# Patient Record
Sex: MALE | Race: BLACK OR AFRICAN AMERICAN | NOT HISPANIC OR LATINO | URBAN - METROPOLITAN AREA
[De-identification: names, ages, dates, MRNs, and addresses within clinical notes are randomized per-mention and may not be internally consistent; named-entity substitution may affect disease eponyms.]

---

## 2020-06-22 ENCOUNTER — INPATIENT (INPATIENT)
Facility: HOSPITAL | Age: 55
LOS: 1 days | Discharge: PSYCHIATRIC OR SUBSTANCE USE FACILITY | End: 2020-06-24
Attending: INTERNAL MEDICINE | Admitting: INTERNAL MEDICINE
Payer: MEDICAID

## 2020-06-22 VITALS
HEART RATE: 95 BPM | OXYGEN SATURATION: 95 % | RESPIRATION RATE: 18 BRPM | TEMPERATURE: 99 F | DIASTOLIC BLOOD PRESSURE: 89 MMHG | SYSTOLIC BLOOD PRESSURE: 134 MMHG

## 2020-06-22 DIAGNOSIS — F25.9 SCHIZOAFFECTIVE DISORDER, UNSPECIFIED: ICD-10-CM

## 2020-06-22 DIAGNOSIS — E11.9 TYPE 2 DIABETES MELLITUS WITHOUT COMPLICATIONS: ICD-10-CM

## 2020-06-22 DIAGNOSIS — R07.9 CHEST PAIN, UNSPECIFIED: ICD-10-CM

## 2020-06-22 DIAGNOSIS — E78.5 HYPERLIPIDEMIA, UNSPECIFIED: ICD-10-CM

## 2020-06-22 DIAGNOSIS — Z29.9 ENCOUNTER FOR PROPHYLACTIC MEASURES, UNSPECIFIED: ICD-10-CM

## 2020-06-22 LAB
ALBUMIN SERPL ELPH-MCNC: 4.3 G/DL — SIGNIFICANT CHANGE UP (ref 3.3–5)
ALP SERPL-CCNC: 56 U/L — SIGNIFICANT CHANGE UP (ref 40–120)
ALT FLD-CCNC: 20 U/L — SIGNIFICANT CHANGE UP (ref 4–41)
ANION GAP SERPL CALC-SCNC: 12 MMO/L — SIGNIFICANT CHANGE UP (ref 7–14)
AST SERPL-CCNC: 15 U/L — SIGNIFICANT CHANGE UP (ref 4–40)
BASOPHILS # BLD AUTO: 0.04 K/UL — SIGNIFICANT CHANGE UP (ref 0–0.2)
BASOPHILS NFR BLD AUTO: 0.9 % — SIGNIFICANT CHANGE UP (ref 0–2)
BILIRUB SERPL-MCNC: 0.5 MG/DL — SIGNIFICANT CHANGE UP (ref 0.2–1.2)
BUN SERPL-MCNC: 12 MG/DL — SIGNIFICANT CHANGE UP (ref 7–23)
CALCIUM SERPL-MCNC: 9.7 MG/DL — SIGNIFICANT CHANGE UP (ref 8.4–10.5)
CHLORIDE SERPL-SCNC: 98 MMOL/L — SIGNIFICANT CHANGE UP (ref 98–107)
CO2 SERPL-SCNC: 28 MMOL/L — SIGNIFICANT CHANGE UP (ref 22–31)
CREAT SERPL-MCNC: 0.81 MG/DL — SIGNIFICANT CHANGE UP (ref 0.5–1.3)
D DIMER BLD IA.RAPID-MCNC: < 150 NG/ML — SIGNIFICANT CHANGE UP
EOSINOPHIL # BLD AUTO: 0.09 K/UL — SIGNIFICANT CHANGE UP (ref 0–0.5)
EOSINOPHIL NFR BLD AUTO: 2 % — SIGNIFICANT CHANGE UP (ref 0–6)
GLUCOSE BLDC GLUCOMTR-MCNC: 205 MG/DL — HIGH (ref 70–99)
GLUCOSE SERPL-MCNC: 151 MG/DL — HIGH (ref 70–99)
HCT VFR BLD CALC: 39 % — SIGNIFICANT CHANGE UP (ref 39–50)
HGB BLD-MCNC: 12.4 G/DL — LOW (ref 13–17)
IMM GRANULOCYTES NFR BLD AUTO: 0.4 % — SIGNIFICANT CHANGE UP (ref 0–1.5)
INR BLD: 0.97 — SIGNIFICANT CHANGE UP (ref 0.88–1.17)
LYMPHOCYTES # BLD AUTO: 1.57 K/UL — SIGNIFICANT CHANGE UP (ref 1–3.3)
LYMPHOCYTES # BLD AUTO: 34.7 % — SIGNIFICANT CHANGE UP (ref 13–44)
MCHC RBC-ENTMCNC: 30.8 PG — SIGNIFICANT CHANGE UP (ref 27–34)
MCHC RBC-ENTMCNC: 31.8 % — LOW (ref 32–36)
MCV RBC AUTO: 97 FL — SIGNIFICANT CHANGE UP (ref 80–100)
MONOCYTES # BLD AUTO: 0.46 K/UL — SIGNIFICANT CHANGE UP (ref 0–0.9)
MONOCYTES NFR BLD AUTO: 10.2 % — SIGNIFICANT CHANGE UP (ref 2–14)
NEUTROPHILS # BLD AUTO: 2.34 K/UL — SIGNIFICANT CHANGE UP (ref 1.8–7.4)
NEUTROPHILS NFR BLD AUTO: 51.8 % — SIGNIFICANT CHANGE UP (ref 43–77)
NRBC # FLD: 0 K/UL — SIGNIFICANT CHANGE UP (ref 0–0)
PLATELET # BLD AUTO: 202 K/UL — SIGNIFICANT CHANGE UP (ref 150–400)
PMV BLD: 10.9 FL — SIGNIFICANT CHANGE UP (ref 7–13)
POTASSIUM SERPL-MCNC: 4.3 MMOL/L — SIGNIFICANT CHANGE UP (ref 3.5–5.3)
POTASSIUM SERPL-SCNC: 4.3 MMOL/L — SIGNIFICANT CHANGE UP (ref 3.5–5.3)
PROT SERPL-MCNC: 6.6 G/DL — SIGNIFICANT CHANGE UP (ref 6–8.3)
PROTHROM AB SERPL-ACNC: 11.1 SEC — SIGNIFICANT CHANGE UP (ref 9.8–13.1)
RBC # BLD: 4.02 M/UL — LOW (ref 4.2–5.8)
RBC # FLD: 13.3 % — SIGNIFICANT CHANGE UP (ref 10.3–14.5)
SODIUM SERPL-SCNC: 138 MMOL/L — SIGNIFICANT CHANGE UP (ref 135–145)
TROPONIN T, HIGH SENSITIVITY: 6 NG/L — SIGNIFICANT CHANGE UP (ref ?–14)
TROPONIN T, HIGH SENSITIVITY: 7 NG/L — SIGNIFICANT CHANGE UP (ref ?–14)
WBC # BLD: 4.52 K/UL — SIGNIFICANT CHANGE UP (ref 3.8–10.5)
WBC # FLD AUTO: 4.52 K/UL — SIGNIFICANT CHANGE UP (ref 3.8–10.5)

## 2020-06-22 RX ORDER — HALOPERIDOL DECANOATE 100 MG/ML
5 INJECTION INTRAMUSCULAR
Refills: 0 | Status: DISCONTINUED | OUTPATIENT
Start: 2020-06-22 | End: 2020-06-24

## 2020-06-22 RX ORDER — SODIUM CHLORIDE 9 MG/ML
1000 INJECTION, SOLUTION INTRAVENOUS
Refills: 0 | Status: DISCONTINUED | OUTPATIENT
Start: 2020-06-22 | End: 2020-06-24

## 2020-06-22 RX ORDER — DEXTROSE 50 % IN WATER 50 %
25 SYRINGE (ML) INTRAVENOUS ONCE
Refills: 0 | Status: DISCONTINUED | OUTPATIENT
Start: 2020-06-22 | End: 2020-06-24

## 2020-06-22 RX ORDER — INSULIN LISPRO 100/ML
VIAL (ML) SUBCUTANEOUS AT BEDTIME
Refills: 0 | Status: DISCONTINUED | OUTPATIENT
Start: 2020-06-22 | End: 2020-06-24

## 2020-06-22 RX ORDER — ENOXAPARIN SODIUM 100 MG/ML
40 INJECTION SUBCUTANEOUS DAILY
Refills: 0 | Status: DISCONTINUED | OUTPATIENT
Start: 2020-06-22 | End: 2020-06-24

## 2020-06-22 RX ORDER — BENZTROPINE MESYLATE 1 MG
1 TABLET ORAL DAILY
Refills: 0 | Status: DISCONTINUED | OUTPATIENT
Start: 2020-06-22 | End: 2020-06-24

## 2020-06-22 RX ORDER — CHOLECALCIFEROL (VITAMIN D3) 125 MCG
2000 CAPSULE ORAL DAILY
Refills: 0 | Status: DISCONTINUED | OUTPATIENT
Start: 2020-06-22 | End: 2020-06-24

## 2020-06-22 RX ORDER — DEXTROSE 50 % IN WATER 50 %
15 SYRINGE (ML) INTRAVENOUS ONCE
Refills: 0 | Status: DISCONTINUED | OUTPATIENT
Start: 2020-06-22 | End: 2020-06-24

## 2020-06-22 RX ORDER — DEXTROSE 50 % IN WATER 50 %
12.5 SYRINGE (ML) INTRAVENOUS ONCE
Refills: 0 | Status: DISCONTINUED | OUTPATIENT
Start: 2020-06-22 | End: 2020-06-24

## 2020-06-22 RX ORDER — INSULIN LISPRO 100/ML
VIAL (ML) SUBCUTANEOUS
Refills: 0 | Status: DISCONTINUED | OUTPATIENT
Start: 2020-06-22 | End: 2020-06-24

## 2020-06-22 RX ORDER — ACETAMINOPHEN 500 MG
650 TABLET ORAL EVERY 6 HOURS
Refills: 0 | Status: DISCONTINUED | OUTPATIENT
Start: 2020-06-22 | End: 2020-06-24

## 2020-06-22 RX ORDER — DIPHENHYDRAMINE HCL 50 MG
50 CAPSULE ORAL AT BEDTIME
Refills: 0 | Status: DISCONTINUED | OUTPATIENT
Start: 2020-06-22 | End: 2020-06-24

## 2020-06-22 RX ORDER — ATORVASTATIN CALCIUM 80 MG/1
10 TABLET, FILM COATED ORAL AT BEDTIME
Refills: 0 | Status: DISCONTINUED | OUTPATIENT
Start: 2020-06-22 | End: 2020-06-24

## 2020-06-22 RX ORDER — GLUCAGON INJECTION, SOLUTION 0.5 MG/.1ML
1 INJECTION, SOLUTION SUBCUTANEOUS ONCE
Refills: 0 | Status: DISCONTINUED | OUTPATIENT
Start: 2020-06-22 | End: 2020-06-24

## 2020-06-22 NOTE — ED ADULT NURSE NOTE - OBJECTIVE STATEMENT
Pt A/Ox4 states he noted intermittent CP that started yesterday when he was sitting in his bed, denies SOB, denies n/v denies diaphoresis, cough f/c. Pt denies radiation of pain. Pt appears well, breathing even and unlabored, skin warm and dry. Noted 2 staff members at bedside with pt. Pt calm at this time. PIV inserted with aseptic technique, blood drawn, labeled at bedside with 2 pt identifiers and sent to lab. Pt aware of POC, NAD. Will continue to monitor. Pt placed on cardiac monitor.

## 2020-06-22 NOTE — H&P ADULT - PROBLEM SELECTOR PLAN 3
-unclear if on insulin, will need to clarify in AM  -for now, continue with low ISS  -CC diet  -f/u hgba1c -unclear if on insulin, will need to clarify in AM. Med rec pharm emailed  -for now, continue with low ISS  -CC diet  -f/u hgba1c

## 2020-06-22 NOTE — ED PROVIDER NOTE - ATTENDING CONTRIBUTION TO CARE
I performed a history and physical exam of the patient and discussed their management with the PA. I reviewed the PA's note and agree with the documented findings and plan of care. I have edited as appropriate. My medical decision making and observations are found above.  L sided chest pain, normal CV exam, multiple CV risk factors, admit to medicine for ACS w/u

## 2020-06-22 NOTE — H&P ADULT - NSHPSOCIALHISTORY_GEN_ALL_CORE
Quit smoking 1 year ago. Previously would smoke 1ppd. Also last used cocaine 1 year ago. Social alcohol use  Resident of [facility]

## 2020-06-22 NOTE — H&P ADULT - ASSESSMENT
56 y/o male with a hx of Szhizoaffective d/o (bipolar type) ([facility] resident), DM, HTN, HLD, tremors 2/2 to psychotropic meds p/w chest pain admitted for r/o ACS

## 2020-06-22 NOTE — H&P ADULT - NSHPLABSRESULTS_GEN_ALL_CORE
(06-22 @ 16:10)                      12.4  4.52 )-----------( 202                 39.0    Neutrophils = 2.34 (51.8%)  Lymphocytes = 1.57 (34.7%)  Eosinophils = 0.09 (2.0%)  Basophils = 0.04 (0.9%)  Monocytes = 0.46 (10.2%)  Bands = --%    06-22    138  |  98  |  12  ----------------------------<  151<H>  4.3   |  28  |  0.81    Ca    9.7      22 Jun 2020 16:10    TPro  6.6  /  Alb  4.3  /  TBili  0.5  /  DBili  x   /  AST  15  /  ALT  20  /  AlkPhos  56  06-22    ( 22 Jun 2020 16:10 )   PT: 11.1 SEC;   INR: 0.97 ;    < from: Xray Chest 2 Views PA/Lat (06.22.20 @ 18:57) >    IMPRESSION:     Clear lungs.        < end of copied text >        Labs reviewed  Imaging reviewed  EKG: NSR, No ST segment elevations or depression. T wave inversions v3-v4, biphasic waves in v5 v6

## 2020-06-22 NOTE — ED PROVIDER NOTE - CLINICAL SUMMARY MEDICAL DECISION MAKING FREE TEXT BOX
56 y/o male with a hx of DM, HTN, HLD, Tremors, Former smoker, hx of cocaine use(8 months ago) presents to the ER c/o 2 days of left sided chest pain. Pt is well appearing, NAD, will check labs, CXR, admit.

## 2020-06-22 NOTE — ED ADULT TRIAGE NOTE - PRO INTERPRETER NEED 2
DATE OF SERVICE:  03/27/20

CLINICAL DATA:  chest and back pain



ENHANCED CHEST CT:  



Multislice acquisition through the chest with IV contrast was performed. 



No priors.  



No evidence of PE.  No pneumothorax.  No pleural effusion.  No aortic aneurysm 
or dissection.  



There are mild emphysematous changes throughout both lungs.  There are linear 
densities in both lung bases consistent with linear atelectasis or fibrosis.  
There is a 4 mm calcified nodule in the right lung base consistent with a 
calcified granuloma from prior granulomatous disease.  



The lungs are otherwise clear. 



The heart size is normal.  No significant pericardial effusion.  



No hilar or mediastinal adenopathy. 



There is a 1.9 cm sharply transcribed fluid density lesion in the left lobe of 
the liver consistent with a benign hepatic cyst.  There is another sharply 
transcribed fluid density lesion in the right lobe of the liver adjacent to the 
gallbladder fossa consistent with benign cyst.  



There is some reflux of the contrast into the right hepatic vein suggesting the 
possibility of right heart failure.  



There is gas within the cervical esophagus.  This is most likely related to GE 
reflux.  



There is a small sclerotic focus within the left 10th rib laterally.  This is 
probably a bone island.  



No other significant findings. 



[id number]
[facility]

## 2020-06-22 NOTE — H&P ADULT - NSHPPHYSICALEXAM_GEN_ALL_CORE
GENERAL APPEARANCE: Well developed, well nourished, alert and cooperative. NAD.   HEENT:  PERRL, EOMI. External auditory canals normal, hearing grossly intact.  NECK: Neck supple, non-tender without lymphadenopathy, masses or thyromegaly.  CARDIAC: Normal S1 and S2. No S3, S4 or murmurs. Rhythm is regular.  LUNGS: Clear to auscultation and percussion without rales, rhonchi, wheezing or diminished breath sounds.  ABDOMEN: Positive bowel sounds. Soft, nondistended, nontender. No guarding or rebound.   MUSCULOSKELETAL: ROM intact spine and extremities. No joint erythema or tenderness.   BACK: normal gait and posture, no spinal deformity, symmetry of spinal muscles, without tenderness, decreased range of motion.  EXTREMITIES: No significant deformity or joint abnormality. No edema. Peripheral pulses intact. No varicosities.  NEUROLOGICAL: CN II-XII intact. Strength and sensation symmetric and intact throughout.   SKIN: Skin normal color, texture and turgor with no lesions or eruptions.  PSYCHIATRIC: AOx3.Normal affect and behavior. GENERAL APPEARANCE: Well developed, well nourished, disheveled, alert and cooperative. NAD.   HEENT:  PERRL, EOMI. External auditory canals normal, hearing grossly intact.  NECK: Neck supple, non-tender without lymphadenopathy, masses or thyromegaly.  CARDIAC: Normal S1 and S2. No S3, S4 or murmurs. Rhythm is regular.  LUNGS: Clear to auscultation and percussion without rales, rhonchi, wheezing or diminished breath sounds.  ABDOMEN: Positive bowel sounds. Soft, distended, nontender. No guarding or rebound.   MUSCULOSKELETAL: ROM intact spine and extremities. No joint erythema or tenderness.   BACK: normal posture, no spinal deformity, symmetry of spinal muscles, without tenderness, decreased range of motion.  EXTREMITIES: No significant deformity or joint abnormality. No edema. Peripheral pulses intact. No varicosities.  NEUROLOGICAL: CN II-XII intact. Strength and sensation symmetric and intact throughout. Resting tremor of left hand  SKIN: Skin normal color, texture and turgor with no lesions or eruptions.  PSYCHIATRIC: AOx3.Normal affect and behavior.

## 2020-06-22 NOTE — ED PROVIDER NOTE - CHPI ED SYMPTOMS NEG
no vomiting/no diaphoresis/no chills/no back pain/no cough/no fever/no nausea/no shortness of breath

## 2020-06-22 NOTE — H&P ADULT - PROBLEM SELECTOR PLAN 1
-Pt p/w atypical chest pain. Improved with tylenol use. Given psych history and comorbidities, will likely need ischemic eval  -No acute ischemic changes on EKG, trops stable. Not in ACS  -Follow up Cards eval in AM regarding stress testing  -TTE in AM  -Ddx includes MSK, GI -Pt p/w atypical chest pain. Improved with tylenol use. Given psych history and comorbidities, will likely need ischemic eval. No active chest pain at present   -No acute ischemic changes on EKG, trops stable. Not in ACS  -Follow up Cards eval in AM regarding stress testing  -TTE in AM  -monitor on telemetry. Serial EKGs if has recurrence of chest pain  -Ddx includes MSK, GI

## 2020-06-22 NOTE — H&P ADULT - NSICDXPASTMEDICALHX_GEN_ALL_CORE_FT
PAST MEDICAL HISTORY:  DM (diabetes mellitus)     HLD (hyperlipidemia)     HTN (hypertension)     Schizoaffective disorder

## 2020-06-22 NOTE — ED PROVIDER NOTE - PROGRESS NOTE DETAILS
Attending MD [employer].  Pt signed out to me in stable condition pending labs, CXR, TBA for ACS nate, dimer 2/2 recent COVIDl, Hx HTN, HLD, DM, former smoker quit 1 yr ago, former cocaine abuser, 2:1 from [facility] here with CP. [facility] [place]: pt admitted to Dr Madden for cardiac workup.  Pt aware and agreeable with plan.  Spoke with Dr Jacob from [place] aware and agreeable with plan.  Tele PA notified. Attending MD Barnes.  Pt reportedly had 2 negative covid tests and now has antibodies.  CXR non-actionable today.  Complaint today is not for cough.  No neutrophil predominance, pt is afebrile.  Likely low risk for active COVID.

## 2020-06-22 NOTE — ED ADULT TRIAGE NOTE - CHIEF COMPLAINT QUOTE
pt coming from [place] with staff x 2 aides.  pt c/o chest pain .  pt was found to have + antibodies to covid on 6/1

## 2020-06-22 NOTE — ED CLERICAL - NS ED CLERK NOTE PRE-ARRIVAL INFORMATION; ADDITIONAL PRE-ARRIVAL INFORMATION
Pt being sent in from [place] unit [facility] pt is and elopement risk is a 2:1 pt is c/o of chest pain. Patient had covid-19 positive antibodies on 6/1/2020.

## 2020-06-22 NOTE — H&P ADULT - NSHPREVIEWOFSYSTEMS_GEN_ALL_CORE
CONSTITUTIONAL:  No weight loss, fever, chills, weakness or fatigue.  HEENT:  Eyes:  No visual loss, blurred vision, double vision or yellow sclerae. Ears, Nose, Throat:  No hearing loss, sneezing, congestion, runny nose or sore throat.  SKIN:  No rash or itching.  CARDIOVASCULAR: +chest pain No palpitations or edema.  RESPIRATORY:  No shortness of breath, cough or sputum.  GASTROINTESTINAL:  No anorexia, nausea, vomiting or diarrhea. No abdominal pain or blood.  GENITOURINARY:  Denies hematuria, dysuria.   NEUROLOGICAL:  +resting tremor No headache, dizziness, syncope, paralysis, ataxia, numbness or tingling in the extremities. No change in bowel or bladder control.  MUSCULOSKELETAL:  No muscle, back pain, joint pain or stiffness.  HEMATOLOGIC:  No anemia, bleeding or bruising.  LYMPHATICS:  No enlarged nodes. No history of splenectomy.  PSYCHIATRIC:  No history of depression or anxiety.  ENDOCRINOLOGIC:  No reports of sweating, cold or heat intolerance. No polyuria or polydipsia.

## 2020-06-22 NOTE — ED PROVIDER NOTE - OBJECTIVE STATEMENT
56 y/o male with a hx of DM, HTN, HLD, Tremors, Former smoker, hx of cocaine use(8 months ago) presents to the ER c/o 2 days of left sided chest pain.  Pain is left sided, non radiating, no exertional.  Pt is from [place] on a 2:1.  Pt did test positive for COVID antibodies.  Pt denies fevers, chills, cough, shortness of breath, weakness, dizziness, back pain.

## 2020-06-22 NOTE — H&P ADULT - HISTORY OF PRESENT ILLNESS
54 y/o male with a hx of Szhizoaffective d/o (bipolar type) ([facility] resident), DM, HTN, HLD, tremors 2/2 to psychotropic meds, Former smoker, hx of cocaine use(8 months ago) presents to the ER c/o 2 days of left sided chest pain.  Pain is left sided, non radiating, non exertional. Pt describes it as a tightening pain that lasted for 45 minutes. Alleviated by tylenol use. No associated symptoms. Last stress test in 2015 was reportedly normal. Prior to 2 days ago, denies chest pain w/ exertion. At [place], pt is on a 2:1.  Pt did test positive for COVID antibodies.  Pt denies fevers, chills, cough, shortness of breath, diaphoresis N/V, weakness, dizziness, back pain. 56 y/o male with a hx of Szhizoaffective d/o (bipolar type) ([facility] resident), DM, HTN, HLD, tremors 2/2 to psychotropic meds, Former smoker, hx of cocaine use(8 months ago) presents to the ER c/o 2 days of left sided chest pain.  Pt is a poor historian. Reports pain is left sided, non radiating, non exertional. Pt describes it as a tightening pain that lasted for 45 minutes. Alleviated by tylenol use. No associated symptoms. Last stress test in 2015 was reportedly normal. Prior to 2 days ago, denies chest pain w/ exertion. At [place], pt is on a 2:1.  Pt did test positive for COVID antibodies.  Pt denies fevers, chills, cough, shortness of breath, diaphoresis N/V, weakness, dizziness, back pain.

## 2020-06-23 PROBLEM — Z00.00 ENCOUNTER FOR PREVENTIVE HEALTH EXAMINATION: Status: ACTIVE | Noted: 2020-06-23

## 2020-06-23 LAB
GLUCOSE BLDC GLUCOMTR-MCNC: 108 MG/DL — HIGH (ref 70–99)
GLUCOSE BLDC GLUCOMTR-MCNC: 110 MG/DL — HIGH (ref 70–99)
GLUCOSE BLDC GLUCOMTR-MCNC: 185 MG/DL — HIGH (ref 70–99)
GLUCOSE BLDC GLUCOMTR-MCNC: 187 MG/DL — HIGH (ref 70–99)
SARS-COV-2 RNA SPEC QL NAA+PROBE: SIGNIFICANT CHANGE UP

## 2020-06-23 RX ORDER — SOD,AMMONIUM,POTASSIUM LACTATE
1 CREAM (GRAM) TOPICAL
Qty: 0 | Refills: 0 | DISCHARGE

## 2020-06-23 RX ORDER — ACETAMINOPHEN 500 MG
1 TABLET ORAL
Qty: 0 | Refills: 0 | DISCHARGE

## 2020-06-23 RX ADMIN — Medication 1 MILLIGRAM(S): at 12:27

## 2020-06-23 RX ADMIN — HALOPERIDOL DECANOATE 5 MILLIGRAM(S): 100 INJECTION INTRAMUSCULAR at 17:28

## 2020-06-23 RX ADMIN — Medication 650 MILLIGRAM(S): at 00:52

## 2020-06-23 RX ADMIN — ENOXAPARIN SODIUM 40 MILLIGRAM(S): 100 INJECTION SUBCUTANEOUS at 12:28

## 2020-06-23 RX ADMIN — Medication 1: at 12:28

## 2020-06-23 RX ADMIN — Medication 2000 UNIT(S): at 12:27

## 2020-06-23 RX ADMIN — HALOPERIDOL DECANOATE 5 MILLIGRAM(S): 100 INJECTION INTRAMUSCULAR at 06:28

## 2020-06-23 RX ADMIN — ATORVASTATIN CALCIUM 10 MILLIGRAM(S): 80 TABLET, FILM COATED ORAL at 21:11

## 2020-06-23 NOTE — DISCHARGE NOTE PROVIDER - NSDCCPCAREPLAN_GEN_ALL_CORE_FT
PRINCIPAL DISCHARGE DIAGNOSIS  Diagnosis: Chest pain  Assessment and Plan of Treatment: no signs of acute coronary syndrome, ECHO EF 63%, normal echo.   follow up with primary care doctor outpatient within 1-2 weeks.      SECONDARY DISCHARGE DIAGNOSES  Diagnosis: DM (diabetes mellitus)  Assessment and Plan of Treatment: continue with diabetic diet    Diagnosis: Schizoaffective disorder  Assessment and Plan of Treatment: continue with cogentin as prescribed and follow up with doctors at facility.    Diagnosis: HLD (hyperlipidemia)  Assessment and Plan of Treatment: continue with medication as prescribed. PRINCIPAL DISCHARGE DIAGNOSIS  Diagnosis: Chest pain  Assessment and Plan of Treatment: no signs of acute coronary syndrome, ECHO EF 63%, normal echo.   follow up with primary care doctor outpatient within 1-2 weeks.      SECONDARY DISCHARGE DIAGNOSES  Diagnosis: Schizoaffective disorder  Assessment and Plan of Treatment: continue with cogentin as prescribed and follow up with doctors at facility.    Diagnosis: DM (diabetes mellitus)  Assessment and Plan of Treatment: Continue consistent carbohydrate diet.  Monitor blood glucose levels throughout the day before meals and at bedtime.  Record blood sugars and bring to outpatient providers appointment in order to be reviewed by your doctor for management modifications.  Be aware of diabetes management symptoms including feeling cool and clammy may be related to low glucose levels.  Feeling hot and dry may indicate high glucose levels.  If  you feel these symptoms, check your blood sugar.  Make regular podiatry appointments in order to have feet checked for wounds and toe nails cut by a doctor to prevent infections.      Diagnosis: HLD (hyperlipidemia)  Assessment and Plan of Treatment: Continue cholesterol control medications. Continue DASH diet. Follow up with your PCP within 1 week of discharge for further management and monitoring of lipid and cholesterol panels.

## 2020-06-23 NOTE — PHARMACOTHERAPY INTERVENTION NOTE - COMMENTS
Medication history is complete. Medication list updated in Outpatient Medication Record (OMR). Please call spectra [phone] if you have any questions.

## 2020-06-23 NOTE — DISCHARGE NOTE PROVIDER - HOSPITAL COURSE
54 y/o male with a hx of Szhizoaffective d/o (bipolar type) ([facility] resident), DM, HTN, HLD, tremors 2/2 to psychotropic meds p/w chest pain admitted for r/o ACS            Chest pain    -Pt p/w atypical chest pain. Improved with tylenol use.  No active chest pain at present     -No acute ischemic changes on EKG, trops stable. Not in ACS    -monitor on telemetry. Serial EKGs if has recurrence of chest pain    -Ddx includes MSK, GI.    -echo- EF 63%- normal echo        Schizoaffective disorder    -calm    -continue with haldol    -c/w benadryl and benztropine for ppx of EPS    -c/w 1:1.         DM (diabetes mellitus)    -unclear if on insulin, will need to clarify in AM. Med rec pharm emailed    -for now, continue with low ISS    -CC diet        HLD    -c/w statin. 56 y/o male with a hx of Szhizoaffective d/o (bipolar type) ([facility] resident), DM, HTN, HLD, tremors 2/2 to psychotropic meds p/w chest pain admitted for r/o ACS            Chest pain    -Pt p/w atypical chest pain. Improved with tylenol use.  No active chest pain at present     -No acute ischemic changes on EKG, trops stable. Not in ACS    -monitor on telemetry. Serial EKGs if has recurrence of chest pain    -Ddx includes MSK, GI.    -echo- EF 63%- normal echo        Schizoaffective disorder    -calm    -continue with haldol    -c/w benadryl and benztropine for ppx of EPS    -c/w 1:1.         DM (diabetes mellitus)    - continue to trend glucose levels.    - continue sliding scale        HLD    -c/w statin.         dispo: back to [facility]

## 2020-06-23 NOTE — CONSULT NOTE ADULT - ASSESSMENT
Patient is a 56 yo man with history of Schizoaffective disorder (bipolar type) ([facility] resident), DM2, HTN, hyperlipidemia, tremors 2/2 to psychotropic medications, former smoker, history of cocaine use (8 months ago) presents to the ER with complaints of 2 days of left-sided chest pain.  Patient is a poor historian.  He states that his pain is left-sided, non-radiating, and non-exertional.  His last episode was described as a chest tightness that lasted for 45 minutes, and was alleviated by Tylenol.  He reports having no other associated cardiac complaints.      Of note, the patient is on constant 2:1 observation at [facility].  Patient also had positive COVID antibodies previously.    In the ED, troponins negative x 2.  No significant findings noted thus far at bedside telemetry in the ED.  At the time of my examination this morning, the patient appeared clinically and hemodynamically stable.  He reports having no new complaints.  He wanted to know his COVID test results.  His physical examination was otherwise unremarkable.  Symptoms appear atypical in nature. Will arrange for an echocardiogram to evaluate cardiac structure/function.  Anticipate with an unremarkable echocardiogram, the patient can be discharged back to facility with outpatient f/u as needed.

## 2020-06-23 NOTE — CONSULT NOTE ADULT - SUBJECTIVE AND OBJECTIVE BOX
Cardiology/Vascular Medicine Inpatient Consultation Note    HISTORY OF PRESENT ILLNESS:  56 y/o male with a hx of Szhizoaffective d/o (bipolar type) ([facility] resident), DM, HTN, HLD, tremors 2/2 to psychotropic meds, Former smoker, hx of cocaine use(8 months ago) presents to the ER c/o 2 days of left sided chest pain.  Pt is a poor historian. Reports pain is left sided, non radiating, non exertional. Pt describes it as a tightening pain that lasted for 45 minutes. Alleviated by tylenol use. No associated symptoms. Last stress test in 2015 was reportedly normal. Prior to 2 days ago, denies chest pain w/ exertion. At [place], pt is on a 2:1.  Pt did test positive for COVID antibodies.  Pt denies fevers, chills, cough, shortness of breath, diaphoresis N/V, weakness, dizziness, back pain. (22 Jun 2020 22:31)          Allergies  No Known Allergies    MEDICATIONS:  enoxaparin Injectable 40 milliGRAM(s) SubCutaneous daily  acetaminophen   Tablet .. 650 milliGRAM(s) Oral every 6 hours PRN  benztropine 1 milliGRAM(s) Oral daily  diphenhydrAMINE 50 milliGRAM(s) Oral at bedtime  haloperidol     Tablet 5 milliGRAM(s) Oral two times a day  bisacodyl 5 milliGRAM(s) Oral every 12 hours PRN  atorvastatin 10 milliGRAM(s) Oral at bedtime  dextrose 40% Gel 15 Gram(s) Oral once PRN  dextrose 50% Injectable 12.5 Gram(s) IV Push once  dextrose 50% Injectable 25 Gram(s) IV Push once  dextrose 50% Injectable 25 Gram(s) IV Push once  glucagon  Injectable 1 milliGRAM(s) IntraMuscular once PRN  insulin lispro (HumaLOG) corrective regimen sliding scale   SubCutaneous three times a day before meals  insulin lispro (HumaLOG) corrective regimen sliding scale   SubCutaneous at bedtim  cholecalciferol 2000 Unit(s) Oral daily  dextrose 5%. 1000 milliLiter(s) IV Continuous <Continuous>    PAST MEDICAL & SURGICAL HISTORY:  Schizoaffective disorder  HLD (hyperlipidemia)  HTN (hypertension)  DM (diabetes mellitus)  No significant past surgical history    FAMILY HISTORY:  No pertinent family history in first degree relatives    SOCIAL HISTORY:    NC    REVIEW OF SYSTEMS:  As above, as per chart notes    PHYSICAL EXAM:  T(C): 36.8 (06-23-20 @ 06:28), Max: 37.1 (06-22-20 @ 16:15)  HR: 76 (06-23-20 @ 06:28) (67 - 95)  BP: 114/74 (06-23-20 @ 06:28) (113/78 - 140/80)  RR: 18 (06-23-20 @ 06:28) (14 - 18)  SpO2: 100% (06-23-20 @ 06:28) (95% - 100%)    Appearance: NAD  HEENT:   Normal oral mucosa, PERRL, EOMI	  Cardiovascular: Normal S1 S2, No JVD, No murmurs, No edema  Respiratory: Decreased breath sounds bilateral bases  Psychiatry:   Gastrointestinal:  Soft, Non-tender, + BS	  Neurologic: Non-focal  Extremities: Normal range of motion, No clubbing, cyanosis or edema      LABS:	 	                          12.4   4.52  )-----------( 202      ( 22 Jun 2020 16:10 )             39.0     06-22    138  |  98  |  12  ----------------------------<  151<H>  4.3   |  28  |  0.81    Ca    9.7      22 Jun 2020 16:10    TPro  6.6  /  Alb  4.3  /  TBili  0.5  /  DBili  x   /  AST  15  /  ALT  20  /  AlkPhos  56  06-22    < from: Xray Chest 2 Views PA/Lat (06.22.20 @ 18:57) >    EXAM:  XR CHEST PA LAT 2V        PROCEDURE DATE:  Jun 22 2020         INTERPRETATION:  CLINICAL INFORMATION: chest pain    EXAM: PA and lateral chest radiographs    COMPARISON: None.    FINDINGS:    The lungs are clear.  The cardiac silhouette is within normal limits.  There is no acute abnormality of the visualized osseous structures.    IMPRESSION:     Clear lungs.        SRINIVAS TRONCOSO M.D., RADIOLOGY RESIDENT  This document has been electronically signed.  IVIS MARTINEZ M.D., ATTENDING RADIOLOGIST  This document has been electronically signed. Jun 22 2020  7:16PM Cardiology/Vascular Medicine Inpatient Consultation Note    HISTORY OF PRESENT ILLNESS:  Patient is a 54 yo man with history of Schizoaffective disorder (bipolar type) ([facility] resident), DM2, HTN, hyperlipidemia, tremors 2/2 to psychotropic medications, former smoker, history of cocaine use (8 months ago) presents to the ER with complaints of 2 days of left-sided chest pain.  Patient is a poor historian.  He states that his pain is left-sided, non-radiating, and non-exertional.  His last episode was described as a chest tightness that lasted for 45 minutes, and was alleviated by Tylenol.  He reports having no other associated cardiac complaints.      Of note, the patient is on constant 2:1 observation at [facility]  Patient also had positive COVID antibodies previously.    In the ED, troponins negative x 2.  No significant findings noted thus far at bedside telemetry in the ED.  At the time of my examination this morning, the patient appeared clinically and hemodynamically stable.  He reports having no new complaints.  He wanted to know his COVID test results.  His physical examination was otherwise unremarkable.  Symptoms appear atypical in nature.  Will arrange for an echocardiogram to evaluate cardiac structure/function.  Anticipate with an unremarkable echocardiogram, the patient can be discharged back to facility with outpatient f/u as needed.    Allergies  No Known Allergies    MEDICATIONS:  enoxaparin Injectable 40 milliGRAM(s) SubCutaneous daily  acetaminophen   Tablet .. 650 milliGRAM(s) Oral every 6 hours PRN  benztropine 1 milliGRAM(s) Oral daily  diphenhydrAMINE 50 milliGRAM(s) Oral at bedtime  haloperidol     Tablet 5 milliGRAM(s) Oral two times a day  bisacodyl 5 milliGRAM(s) Oral every 12 hours PRN  atorvastatin 10 milliGRAM(s) Oral at bedtime  dextrose 40% Gel 15 Gram(s) Oral once PRN  dextrose 50% Injectable 12.5 Gram(s) IV Push once  dextrose 50% Injectable 25 Gram(s) IV Push once  dextrose 50% Injectable 25 Gram(s) IV Push once  glucagon  Injectable 1 milliGRAM(s) IntraMuscular once PRN  insulin lispro (HumaLOG) corrective regimen sliding scale   SubCutaneous three times a day before meals  insulin lispro (HumaLOG) corrective regimen sliding scale   SubCutaneous at bedtim  cholecalciferol 2000 Unit(s) Oral daily  dextrose 5%. 1000 milliLiter(s) IV Continuous <Continuous>    PAST MEDICAL & SURGICAL HISTORY:  Schizoaffective disorder  HLD (hyperlipidemia)  HTN (hypertension)  DM (diabetes mellitus)  No significant past surgical history    FAMILY HISTORY:  No pertinent family history in first degree relatives    SOCIAL HISTORY:    NC    REVIEW OF SYSTEMS:  As above, as per chart notes    PHYSICAL EXAM:  T(C): 36.8 (06-23-20 @ 06:28), Max: 37.1 (06-22-20 @ 16:15)  HR: 76 (06-23-20 @ 06:28) (67 - 95)  BP: 114/74 (06-23-20 @ 06:28) (113/78 - 140/80)  RR: 18 (06-23-20 @ 06:28) (14 - 18)  SpO2: 100% (06-23-20 @ 06:28) (95% - 100%)    Appearance: NAD, sitting up in bed  HEENT:   No JVD  Cardiovascular: Normal S1 S2, No murmurs  Respiratory: Decreased breath sounds bilateral bases  Psychiatry: awake, alert  Gastrointestinal:  Soft, Non-tender, + BS	  Neurologic: Non-focal  Extremities: No edema    LABS:	 	                          12.4   4.52  )-----------( 202      ( 22 Jun 2020 16:10 )             39.0     06-22    138  |  98  |  12  ----------------------------<  151<H>  4.3   |  28  |  0.81    Ca    9.7      22 Jun 2020 16:10    TPro  6.6  /  Alb  4.3  /  TBili  0.5  /  DBili  x   /  AST  15  /  ALT  20  /  AlkPhos  56  06-22    < from: Xray Chest 2 Views PA/Lat (06.22.20 @ 18:57) >    EXAM:  XR CHEST PA LAT 2V        PROCEDURE DATE:  Jun 22 2020         INTERPRETATION:  CLINICAL INFORMATION: chest pain    EXAM: PA and lateral chest radiographs    COMPARISON: None.    FINDINGS:    The lungs are clear.  The cardiac silhouette is within normal limits.  There is no acute abnormality of the visualized osseous structures.    IMPRESSION:     Clear lungs.        SRINIVAS TRONCOSO M.D., RADIOLOGY RESIDENT  This document has been electronically signed.  IVIS MARTINEZ M.D., ATTENDING RADIOLOGIST  This document has been electronically signed. Jun 22 2020  7:16PM

## 2020-06-23 NOTE — DISCHARGE NOTE PROVIDER - NSDCMRMEDTOKEN_GEN_ALL_CORE_FT
acetaminophen 325 mg oral tablet: 2 tab(s) orally every 6 hours, As Needed  atorvastatin 10 mg oral tablet: 1 tab(s) orally once a day  benztropine 1 mg oral tablet: 1 tab(s) orally once a day  bisacodyl 5 mg oral delayed release tablet: 2 tab(s) orally once a day, As Needed  cholecalciferol 2000 intl units (50 mcg) oral tablet: 1 tab(s) orally once a day  diphenhydrAMINE 50 mg oral tablet: 1 tab(s) orally once (at bedtime)  docusate potassium 100 mg oral capsule: 2 cap(s) orally once a day, As Needed  Haldol 5 mg oral tablet: 1 tab(s) orally 2 times a day  insulin regular 100 units/mL human recombinant injectable solution: Sliding scale injectable  Per Tin, blood glucose level checked at 7:00AM  menthol-methyl salicylate 7.6%-29% topical ointment: Apply topically to affected area 3 times a day, As Needed  metFORMIN 1000 mg oral tablet: 1 tab(s) orally 2 times a day atorvastatin 10 mg oral tablet: 1 tab(s) orally once a day  benztropine 1 mg oral tablet: 1 tab(s) orally once a day  bisacodyl 5 mg oral delayed release tablet: 2 tab(s) orally once a day, As Needed  cholecalciferol 2000 intl units (50 mcg) oral tablet: 1 tab(s) orally once a day  diphenhydrAMINE 50 mg oral tablet: 1 tab(s) orally once (at bedtime)  docusate potassium 100 mg oral capsule: 2 cap(s) orally once a day, As Needed  Haldol 5 mg oral tablet: 1 tab(s) orally 2 times a day  insulin regular 100 units/mL human recombinant injectable solution: Sliding scale injectable  Per Tin, blood glucose level checked at 7:00AM  menthol-methyl salicylate 7.6%-29% topical ointment: Apply topically to affected area 3 times a day, As Needed  metFORMIN 1000 mg oral tablet: 1 tab(s) orally 2 times a day

## 2020-06-24 VITALS
SYSTOLIC BLOOD PRESSURE: 108 MMHG | HEART RATE: 84 BPM | TEMPERATURE: 98 F | DIASTOLIC BLOOD PRESSURE: 80 MMHG | OXYGEN SATURATION: 95 % | RESPIRATION RATE: 18 BRPM

## 2020-06-24 LAB
GLUCOSE BLDC GLUCOMTR-MCNC: 111 MG/DL — HIGH (ref 70–99)
GLUCOSE BLDC GLUCOMTR-MCNC: 160 MG/DL — HIGH (ref 70–99)

## 2020-06-24 RX ORDER — ACETAMINOPHEN 500 MG
2 TABLET ORAL
Qty: 0 | Refills: 0 | DISCHARGE

## 2020-06-24 RX ADMIN — Medication 1: at 12:24

## 2020-06-24 RX ADMIN — HALOPERIDOL DECANOATE 5 MILLIGRAM(S): 100 INJECTION INTRAMUSCULAR at 09:12

## 2020-06-24 RX ADMIN — Medication 2000 UNIT(S): at 12:32

## 2020-06-24 RX ADMIN — ENOXAPARIN SODIUM 40 MILLIGRAM(S): 100 INJECTION SUBCUTANEOUS at 12:32

## 2020-06-24 RX ADMIN — Medication 1 MILLIGRAM(S): at 12:32

## 2020-06-24 NOTE — PROGRESS NOTE ADULT - SUBJECTIVE AND OBJECTIVE BOX
Cardiology/Vascular Medicine Inpatient Progress Note      Vital Signs Last 24 Hrs  T(C): 36.7 (24 Jun 2020 04:59), Max: 36.8 (23 Jun 2020 12:06)  T(F): 98 (24 Jun 2020 04:59), Max: 98.2 (23 Jun 2020 12:06)  HR: 89 (24 Jun 2020 04:59) (76 - 89)  BP: 117/78 (24 Jun 2020 04:59) (114/82 - 124/68)  BP(mean): --  RR: 18 (24 Jun 2020 04:59) (16 - 18)  SpO2: 98% (24 Jun 2020 04:59) (96% - 98%)    I&O's Detail    23 Jun 2020 07:01  -  24 Jun 2020 06:44  --------------------------------------------------------  IN:    Oral Fluid: 400 mL  Total IN: 400 mL    OUT:  Total OUT: 0 mL    Total NET: 400 mL      Appearance: NAD, sitting up in bed  HEENT:   No JVD  Cardiovascular: Normal S1 S2, No murmurs  Respiratory: Decreased breath sounds bilateral bases  Psychiatry: awake, alert  Gastrointestinal:  Soft, Non-tender, + BS	  Neurologic: Non-focal  Extremities: No edema    MEDICATIONS  (STANDING):  atorvastatin 10 milliGRAM(s) Oral at bedtime  benztropine 1 milliGRAM(s) Oral daily  cholecalciferol 2000 Unit(s) Oral daily  dextrose 5%. 1000 milliLiter(s) (50 mL/Hr) IV Continuous <Continuous>  dextrose 50% Injectable 12.5 Gram(s) IV Push once  dextrose 50% Injectable 25 Gram(s) IV Push once  dextrose 50% Injectable 25 Gram(s) IV Push once  diphenhydrAMINE 50 milliGRAM(s) Oral at bedtime  enoxaparin Injectable 40 milliGRAM(s) SubCutaneous daily  haloperidol     Tablet 5 milliGRAM(s) Oral two times a day  insulin lispro (HumaLOG) corrective regimen sliding scale   SubCutaneous three times a day before meals  insulin lispro (HumaLOG) corrective regimen sliding scale   SubCutaneous at bedtime    MEDICATIONS  (PRN):  acetaminophen   Tablet .. 650 milliGRAM(s) Oral every 6 hours PRN Mild Pain (1 - 3), Moderate Pain (4 - 6)  bisacodyl 5 milliGRAM(s) Oral every 12 hours PRN Constipation  dextrose 40% Gel 15 Gram(s) Oral once PRN Blood Glucose LESS THAN 70 milliGRAM(s)/deciliter  glucagon  Injectable 1 milliGRAM(s) IntraMuscular once PRN Glucose LESS THAN 70 milligrams/deciliter      LABS:	 	                                12.4   4.52  )-----------( 202      ( 22 Jun 2020 16:10 )             39.0   06-22    138  |  98  |  12  ----------------------------<  151<H>  4.3   |  28  |  0.81    Ca    9.7      22 Jun 2020 16:10    TPro  6.6  /  Alb  4.3  /  TBili  0.5  /  DBili  x   /  AST  15  /  ALT  20  /  AlkPhos  56  06-22    PT/INR - ( 22 Jun 2020 16:10 )   PT: 11.1 SEC;   INR: 0.97                       < from: Xray Chest 2 Views PA/Lat (06.22.20 @ 18:57) >    EXAM:  XR CHEST PA LAT 2V        PROCEDURE DATE:  Jun 22 2020         INTERPRETATION:  CLINICAL INFORMATION: chest pain    EXAM: PA and lateral chest radiographs    COMPARISON: None.    FINDINGS:    The lungs are clear.  The cardiac silhouette is within normal limits.  There is no acute abnormality of the visualized osseous structures.    IMPRESSION:     Clear lungs.        SRINIVAS TRONCOSO M.D., RADIOLOGY RESIDENT  This document has been electronically signed.  IVIS MARTINEZ M.D., ATTENDING RADIOLOGIST  This document has been electronically signed. Jun 22 2020  7:16PM    < from: Transthoracic Echocardiogram (06.23.20 @ 07:47) >    Patient name: SEVERO CHIRINOS  YOB: 1965   Age: 55 (M)   MR#: [id number]  Study Date: 6/23/2020  [facility] Aleksandra Harrell RDCS  Study quality: Technically Fair  Referring Physician: Pedro Madden MD  Blood Pressure: 114/74 mmHg  Height: 173 cm  Weight: 100 kg  BSA: 2.1 m2  ------------------------------------------------------------------------  PROCEDURE: Transthoracic echocardiogram with 2-D, M-Mode  and complete spectral and color flow Doppler.  INDICATION: Chestpain, unspecified (R07.9)  ------------------------------------------------------------------------  DIMENSIONS:  Dimensions:     Normal Values:  LA:     3.1 cm    2.0 - 4.0 cm  Ao:     3.7 cm    2.0 - 3.8 cm  SEPTUM: 0.8 cm    0.6 - 1.2 cm  PWT:    0.7 cm    0.6 - 1.1 cm  LVIDd:  4.4 cm    3.0 - 5.6 cm  LVIDs:  2.9 cm    1.8 - 4.0 cm  Derived Variables:  LVMI: 47 g/m2  RWT: 0.31  Fractional short: 34 %  Ejection Fraction (Teicholtz): 63 %  ------------------------------------------------------------------------  OBSERVATIONS:  Mitral Valve: Normal mitral valve. Minimal mitral  regurgitation.  Aortic Root: Normal aortic root.  Aortic Valve: Normal trileaflet aortic valve.  Left Atrium: Normal left atrium.  LA volume index = 22  cc/m2.  Left Ventricle: Normal left ventricular systolic function.  No segmental wall motion abnormalities. Normal left  ventricular internal dimensions and wall thicknesses.  Right Heart: Normal right atrium. Normal right ventricular  size and function. Normal tricuspid valve.  Minimal  tricuspid regurgitation. Normal pulmonic valve.  Minimal  pulmonic regurgitation.  Pericardium/PleuraNormal pericardium with no pericardial  effusion.  ------------------------------------------------------------------------  CONCLUSIONS:  1. Normal mitral valve. Minimal mitral regurgitation.  2. Normal left ventricular internal dimensions and wall  thicknesses.  3. Normal left ventricular systolic function. No segmental  wall motion abnormalities.  4. Normal right ventricular size and function.  ------------------------------------------------------------------------  Confirmed on  6/23/2020 - 09:27:21 by Lupillo Mccullough M.D.  ------------------------------------------------------------------------    < end of copied text > Cardiology/Vascular Medicine Inpatient Progress Note    No new complaints  Unremarkable echocardiogram  Negative cardiac troponins  No new events noted on telemetry    Patient may be discharged back to [facility]  No further cardiac testing needed    Vital Signs Last 24 Hrs  T(C): 36.7 (24 Jun 2020 04:59), Max: 36.8 (23 Jun 2020 12:06)  T(F): 98 (24 Jun 2020 04:59), Max: 98.2 (23 Jun 2020 12:06)  HR: 89 (24 Jun 2020 04:59) (76 - 89)  BP: 117/78 (24 Jun 2020 04:59) (114/82 - 124/68)  BP(mean): --  RR: 18 (24 Jun 2020 04:59) (16 - 18)  SpO2: 98% (24 Jun 2020 04:59) (96% - 98%)    I&O's Detail    23 Jun 2020 07:01  -  24 Jun 2020 06:44  --------------------------------------------------------  IN:    Oral Fluid: 400 mL  Total IN: 400 mL    OUT:  Total OUT: 0 mL    Total NET: 400 mL      Appearance: NAD, sitting up in bed  HEENT:   No JVD  Cardiovascular: Normal S1 S2, No murmurs  Respiratory: Decreased breath sounds bilateral bases  Psychiatry: awake, alert  Gastrointestinal:  Soft, Non-tender, + BS	  Neurologic: Non-focal  Extremities: No edema    MEDICATIONS  (STANDING):  atorvastatin 10 milliGRAM(s) Oral at bedtime  benztropine 1 milliGRAM(s) Oral daily  cholecalciferol 2000 Unit(s) Oral daily  dextrose 5%. 1000 milliLiter(s) (50 mL/Hr) IV Continuous <Continuous>  dextrose 50% Injectable 12.5 Gram(s) IV Push once  dextrose 50% Injectable 25 Gram(s) IV Push once  dextrose 50% Injectable 25 Gram(s) IV Push once  diphenhydrAMINE 50 milliGRAM(s) Oral at bedtime  enoxaparin Injectable 40 milliGRAM(s) SubCutaneous daily  haloperidol     Tablet 5 milliGRAM(s) Oral two times a day  insulin lispro (HumaLOG) corrective regimen sliding scale   SubCutaneous three times a day before meals  insulin lispro (HumaLOG) corrective regimen sliding scale   SubCutaneous at bedtime    MEDICATIONS  (PRN):  acetaminophen   Tablet .. 650 milliGRAM(s) Oral every 6 hours PRN Mild Pain (1 - 3), Moderate Pain (4 - 6)  bisacodyl 5 milliGRAM(s) Oral every 12 hours PRN Constipation  dextrose 40% Gel 15 Gram(s) Oral once PRN Blood Glucose LESS THAN 70 milliGRAM(s)/deciliter  glucagon  Injectable 1 milliGRAM(s) IntraMuscular once PRN Glucose LESS THAN 70 milligrams/deciliter      LABS:	 	                                12.4   4.52  )-----------( 202      ( 22 Jun 2020 16:10 )             39.0   06-22    138  |  98  |  12  ----------------------------<  151<H>  4.3   |  28  |  0.81    Ca    9.7      22 Jun 2020 16:10    TPro  6.6  /  Alb  4.3  /  TBili  0.5  /  DBili  x   /  AST  15  /  ALT  20  /  AlkPhos  56  06-22    PT/INR - ( 22 Jun 2020 16:10 )   PT: 11.1 SEC;   INR: 0.97                       < from: Xray Chest 2 Views PA/Lat (06.22.20 @ 18:57) >    EXAM:  XR CHEST PA LAT 2V        PROCEDURE DATE:  Jun 22 2020         INTERPRETATION:  CLINICAL INFORMATION: chest pain    EXAM: PA and lateral chest radiographs    COMPARISON: None.    FINDINGS:    The lungs are clear.  The cardiac silhouette is within normal limits.  There is no acute abnormality of the visualized osseous structures.    IMPRESSION:     Clear lungs.        SRINIVAS TRONCOSO M.D., RADIOLOGY RESIDENT  This document has been electronically signed.  IVIS MARTINEZ M.D., ATTENDING RADIOLOGIST  This document has been electronically signed. Jun 22 2020  7:16PM    < from: Transthoracic Echocardiogram (06.23.20 @ 07:47) >    Patient name: SEVERO CHIRINOS  YOB: 1965   Age: 55 (M)   MR#: [id number]  Study Date: 6/23/2020  [facility] RDCS  Study quality: Technically Fair  Referring Physician: Pedro Madden MD  Blood Pressure: 114/74 mmHg  Height: 173 cm  Weight: 100 kg  BSA: 2.1 m2  ------------------------------------------------------------------------  PROCEDURE: Transthoracic echocardiogram with 2-D, M-Mode  and complete spectral and color flow Doppler.  INDICATION: Chestpain, unspecified (R07.9)  ------------------------------------------------------------------------  DIMENSIONS:  Dimensions:     Normal Values:  LA:     3.1 cm    2.0 - 4.0 cm  Ao:     3.7 cm    2.0 - 3.8 cm  SEPTUM: 0.8 cm    0.6 - 1.2 cm  PWT:    0.7 cm    0.6 - 1.1 cm  LVIDd:  4.4 cm    3.0 - 5.6 cm  LVIDs:  2.9 cm    1.8 - 4.0 cm  Derived Variables:  LVMI: 47 g/m2  RWT: 0.31  Fractional short: 34 %  Ejection Fraction (Teicholtz): 63 %  ------------------------------------------------------------------------  OBSERVATIONS:  Mitral Valve: Normal mitral valve. Minimal mitral  regurgitation.  Aortic Root: Normal aortic root.  Aortic Valve: Normal trileaflet aortic valve.  Left Atrium: Normal left atrium.  LA volume index = 22  cc/m2.  Left Ventricle: Normal left ventricular systolic function.  No segmental wall motion abnormalities. Normal left  ventricular internal dimensions and wall thicknesses.  Right Heart: Normal right atrium. Normal right ventricular  size and function. Normal tricuspid valve.  Minimal  tricuspid regurgitation. Normal pulmonic valve.  Minimal  pulmonic regurgitation.  Pericardium/PleuraNormal pericardium with no pericardial  effusion.  ------------------------------------------------------------------------  CONCLUSIONS:  1. Normal mitral valve. Minimal mitral regurgitation.  2. Normal left ventricular internal dimensions and wall  thicknesses.  3. Normal left ventricular systolic function. No segmental  wall motion abnormalities.  4. Normal right ventricular size and function.  ------------------------------------------------------------------------  Confirmed on  6/23/2020 - 09:27:21 by Lupillo Mccullough M.D.  ------------------------------------------------------------------------    < end of copied text >

## 2020-06-24 NOTE — DISCHARGE NOTE NURSING/CASE MANAGEMENT/SOCIAL WORK - PATIENT PORTAL LINK FT
You can access the FollowMyHealth Patient Portal offered by [facility] by registering at the following website: [email or web address]. By joining [facility]’s FollowMyHealth portal, you will also be able to view your health information using other applications (apps) compatible with our system.

## 2020-06-24 NOTE — PROGRESS NOTE ADULT - ASSESSMENT
Patient is a 54 yo man with history of Schizoaffective disorder (bipolar type) ([facility] resident), DM2, HTN, hyperlipidemia, tremors 2/2 to psychotropic medications, former smoker, history of cocaine use (8 months ago) presents to the ER with complaints of 2 days of left-sided chest pain.  Patient is a poor historian.  He states that his pain is left-sided, non-radiating, and non-exertional.  His last episode was described as a chest tightness that lasted for 45 minutes, and was alleviated by Tylenol.  He reports having no other associated cardiac complaints.      Of note, the patient is on constant 2:1 observation at [facility].  Patient also had positive COVID antibodies previously.    In the ED, troponins negative x 2.  No significant findings noted thus far at bedside telemetry in the ED.  At the time of my examination this morning, the patient appeared clinically and hemodynamically stable.  He reports having no new complaints.  He wanted to know his COVID test results.  His physical examination was otherwise unremarkable.  Symptoms appear atypical in nature. Will arrange for an echocardiogram to evaluate cardiac structure/function.  Anticipate with an unremarkable echocardiogram, the patient can be discharged back to facility with outpatient f/u as needed. Patient is a 54 yo man with history of Schizoaffective disorder (bipolar type) ([facility] resident), DM2, HTN, hyperlipidemia, tremors 2/2 to psychotropic medications, former smoker, history of cocaine use (8 months ago) presents to the ER with complaints of 2 days of left-sided chest pain.  Patient is a poor historian.  He states that his pain is left-sided, non-radiating, and non-exertional.  His last episode was described as a chest tightness that lasted for 45 minutes, and was alleviated by Tylenol.  He reports having no other associated cardiac complaints.      Of note, the patient is on constant 2:1 observation at [facility].  Patient also had positive COVID antibodies previously.    In the ED, troponins negative x 2.  No significant findings noted thus far at bedside telemetry in the ED.  At the time of my examination this morning, the patient appeared clinically and hemodynamically stable.  He reports having no new complaints.  He wanted to know his COVID test results.  His physical examination was otherwise unremarkable.  Symptoms appear atypical in nature.   No new complaints  Unremarkable echocardiogram  Negative cardiac troponins  No new events noted on telemetry    Patient may be discharged back to [facility]  No further cardiac testing needed

## 2020-06-26 PROBLEM — Z00.00 ENCOUNTER FOR PREVENTIVE HEALTH EXAMINATION: Status: ACTIVE | Noted: 2020-06-26

## 2022-08-04 ENCOUNTER — INPATIENT (INPATIENT)
Facility: HOSPITAL | Age: 57
LOS: 3 days | Discharge: PSYCHIATRIC OR SUBSTANCE USE FACILITY | End: 2022-08-08
Attending: INTERNAL MEDICINE | Admitting: INTERNAL MEDICINE

## 2022-08-04 VITALS
HEIGHT: 71 IN | OXYGEN SATURATION: 100 % | RESPIRATION RATE: 16 BRPM | SYSTOLIC BLOOD PRESSURE: 166 MMHG | TEMPERATURE: 98 F | HEART RATE: 74 BPM | DIASTOLIC BLOOD PRESSURE: 96 MMHG

## 2022-08-04 DIAGNOSIS — I10 ESSENTIAL (PRIMARY) HYPERTENSION: ICD-10-CM

## 2022-08-04 DIAGNOSIS — I21.4 NON-ST ELEVATION (NSTEMI) MYOCARDIAL INFARCTION: ICD-10-CM

## 2022-08-04 DIAGNOSIS — Z29.9 ENCOUNTER FOR PROPHYLACTIC MEASURES, UNSPECIFIED: ICD-10-CM

## 2022-08-04 DIAGNOSIS — F14.10 COCAINE ABUSE, UNCOMPLICATED: ICD-10-CM

## 2022-08-04 DIAGNOSIS — E11.9 TYPE 2 DIABETES MELLITUS WITHOUT COMPLICATIONS: ICD-10-CM

## 2022-08-04 DIAGNOSIS — E78.5 HYPERLIPIDEMIA, UNSPECIFIED: ICD-10-CM

## 2022-08-04 PROBLEM — F25.9 SCHIZOAFFECTIVE DISORDER, UNSPECIFIED: Chronic | Status: ACTIVE | Noted: 2020-06-22

## 2022-08-04 LAB
ALBUMIN SERPL ELPH-MCNC: 4.2 G/DL — SIGNIFICANT CHANGE UP (ref 3.3–5)
ALP SERPL-CCNC: 59 U/L — SIGNIFICANT CHANGE UP (ref 40–120)
ALT FLD-CCNC: 22 U/L — SIGNIFICANT CHANGE UP (ref 4–41)
ANION GAP SERPL CALC-SCNC: 11 MMOL/L — SIGNIFICANT CHANGE UP (ref 7–14)
APTT BLD: 26.6 SEC — LOW (ref 27–36.3)
APTT BLD: 32.7 SEC — SIGNIFICANT CHANGE UP (ref 27–36.3)
APTT BLD: 47.5 SEC — HIGH (ref 27–36.3)
AST SERPL-CCNC: 40 U/L — SIGNIFICANT CHANGE UP (ref 4–40)
BASOPHILS # BLD AUTO: 0.06 K/UL — SIGNIFICANT CHANGE UP (ref 0–0.2)
BASOPHILS NFR BLD AUTO: 0.7 % — SIGNIFICANT CHANGE UP (ref 0–2)
BILIRUB SERPL-MCNC: 0.3 MG/DL — SIGNIFICANT CHANGE UP (ref 0.2–1.2)
BUN SERPL-MCNC: 12 MG/DL — SIGNIFICANT CHANGE UP (ref 7–23)
CALCIUM SERPL-MCNC: 9.5 MG/DL — SIGNIFICANT CHANGE UP (ref 8.4–10.5)
CHLORIDE SERPL-SCNC: 93 MMOL/L — LOW (ref 98–107)
CO2 SERPL-SCNC: 26 MMOL/L — SIGNIFICANT CHANGE UP (ref 22–31)
CREAT SERPL-MCNC: 0.85 MG/DL — SIGNIFICANT CHANGE UP (ref 0.5–1.3)
EGFR: 102 ML/MIN/1.73M2 — SIGNIFICANT CHANGE UP
EOSINOPHIL # BLD AUTO: 0.18 K/UL — SIGNIFICANT CHANGE UP (ref 0–0.5)
EOSINOPHIL NFR BLD AUTO: 2.2 % — SIGNIFICANT CHANGE UP (ref 0–6)
GLUCOSE SERPL-MCNC: 192 MG/DL — HIGH (ref 70–99)
HCT VFR BLD CALC: 41 % — SIGNIFICANT CHANGE UP (ref 39–50)
HCT VFR BLD CALC: 44.9 % — SIGNIFICANT CHANGE UP (ref 39–50)
HGB BLD-MCNC: 13.7 G/DL — SIGNIFICANT CHANGE UP (ref 13–17)
HGB BLD-MCNC: 14.9 G/DL — SIGNIFICANT CHANGE UP (ref 13–17)
IANC: 5.86 K/UL — SIGNIFICANT CHANGE UP (ref 1.8–7.4)
IMM GRANULOCYTES NFR BLD AUTO: 0.2 % — SIGNIFICANT CHANGE UP (ref 0–1.5)
INR BLD: 0.94 RATIO — SIGNIFICANT CHANGE UP (ref 0.88–1.16)
LYMPHOCYTES # BLD AUTO: 1.38 K/UL — SIGNIFICANT CHANGE UP (ref 1–3.3)
LYMPHOCYTES # BLD AUTO: 16.7 % — SIGNIFICANT CHANGE UP (ref 13–44)
MCHC RBC-ENTMCNC: 32 PG — SIGNIFICANT CHANGE UP (ref 27–34)
MCHC RBC-ENTMCNC: 32.1 PG — SIGNIFICANT CHANGE UP (ref 27–34)
MCHC RBC-ENTMCNC: 33.2 GM/DL — SIGNIFICANT CHANGE UP (ref 32–36)
MCHC RBC-ENTMCNC: 33.4 GM/DL — SIGNIFICANT CHANGE UP (ref 32–36)
MCV RBC AUTO: 96 FL — SIGNIFICANT CHANGE UP (ref 80–100)
MCV RBC AUTO: 96.6 FL — SIGNIFICANT CHANGE UP (ref 80–100)
MONOCYTES # BLD AUTO: 0.75 K/UL — SIGNIFICANT CHANGE UP (ref 0–0.9)
MONOCYTES NFR BLD AUTO: 9.1 % — SIGNIFICANT CHANGE UP (ref 2–14)
NEUTROPHILS # BLD AUTO: 5.86 K/UL — SIGNIFICANT CHANGE UP (ref 1.8–7.4)
NEUTROPHILS NFR BLD AUTO: 71.1 % — SIGNIFICANT CHANGE UP (ref 43–77)
NRBC # BLD: 0 /100 WBCS — SIGNIFICANT CHANGE UP
NRBC # BLD: 0 /100 WBCS — SIGNIFICANT CHANGE UP
NRBC # FLD: 0 K/UL — SIGNIFICANT CHANGE UP
NRBC # FLD: 0 K/UL — SIGNIFICANT CHANGE UP
PCP SPEC-MCNC: SIGNIFICANT CHANGE UP
PLATELET # BLD AUTO: 191 K/UL — SIGNIFICANT CHANGE UP (ref 150–400)
PLATELET # BLD AUTO: 194 K/UL — SIGNIFICANT CHANGE UP (ref 150–400)
POTASSIUM SERPL-MCNC: 4.4 MMOL/L — SIGNIFICANT CHANGE UP (ref 3.5–5.3)
POTASSIUM SERPL-SCNC: 4.4 MMOL/L — SIGNIFICANT CHANGE UP (ref 3.5–5.3)
PROT SERPL-MCNC: 7.2 G/DL — SIGNIFICANT CHANGE UP (ref 6–8.3)
PROTHROM AB SERPL-ACNC: 10.9 SEC — SIGNIFICANT CHANGE UP (ref 10.5–13.4)
RBC # BLD: 4.27 M/UL — SIGNIFICANT CHANGE UP (ref 4.2–5.8)
RBC # BLD: 4.65 M/UL — SIGNIFICANT CHANGE UP (ref 4.2–5.8)
RBC # FLD: 12.2 % — SIGNIFICANT CHANGE UP (ref 10.3–14.5)
RBC # FLD: 12.4 % — SIGNIFICANT CHANGE UP (ref 10.3–14.5)
SARS-COV-2 RNA SPEC QL NAA+PROBE: SIGNIFICANT CHANGE UP
SODIUM SERPL-SCNC: 130 MMOL/L — LOW (ref 135–145)
TOXICOLOGY SCREEN, DRUGS OF ABUSE, SERUM RESULT: SIGNIFICANT CHANGE UP
TROPONIN T, HIGH SENSITIVITY RESULT: 154 NG/L — CRITICAL HIGH
TROPONIN T, HIGH SENSITIVITY RESULT: 273 NG/L — CRITICAL HIGH
TROPONIN T, HIGH SENSITIVITY RESULT: 388 NG/L — CRITICAL HIGH
WBC # BLD: 7.19 K/UL — SIGNIFICANT CHANGE UP (ref 3.8–10.5)
WBC # BLD: 8.25 K/UL — SIGNIFICANT CHANGE UP (ref 3.8–10.5)
WBC # FLD AUTO: 7.19 K/UL — SIGNIFICANT CHANGE UP (ref 3.8–10.5)
WBC # FLD AUTO: 8.25 K/UL — SIGNIFICANT CHANGE UP (ref 3.8–10.5)

## 2022-08-04 RX ORDER — SODIUM CHLORIDE 9 MG/ML
1000 INJECTION INTRAMUSCULAR; INTRAVENOUS; SUBCUTANEOUS
Refills: 0 | Status: COMPLETED | OUTPATIENT
Start: 2022-08-04 | End: 2022-08-04

## 2022-08-04 RX ORDER — HEPARIN SODIUM 5000 [USP'U]/ML
6000 INJECTION INTRAVENOUS; SUBCUTANEOUS EVERY 6 HOURS
Refills: 0 | Status: DISCONTINUED | OUTPATIENT
Start: 2022-08-04 | End: 2022-08-05

## 2022-08-04 RX ORDER — SODIUM CHLORIDE 9 MG/ML
1000 INJECTION, SOLUTION INTRAVENOUS
Refills: 0 | Status: DISCONTINUED | OUTPATIENT
Start: 2022-08-04 | End: 2022-08-08

## 2022-08-04 RX ORDER — TRIHEXYPHENIDYL HCL 2 MG
7.5 TABLET ORAL
Refills: 0 | Status: DISCONTINUED | OUTPATIENT
Start: 2022-08-04 | End: 2022-08-08

## 2022-08-04 RX ORDER — ATORVASTATIN CALCIUM 80 MG/1
40 TABLET, FILM COATED ORAL AT BEDTIME
Refills: 0 | Status: DISCONTINUED | OUTPATIENT
Start: 2022-08-04 | End: 2022-08-08

## 2022-08-04 RX ORDER — INSULIN HUMAN 100 [IU]/ML
0 INJECTION, SOLUTION SUBCUTANEOUS
Qty: 0 | Refills: 0 | DISCHARGE

## 2022-08-04 RX ORDER — OLANZAPINE 15 MG/1
1 TABLET, FILM COATED ORAL
Qty: 0 | Refills: 0 | DISCHARGE

## 2022-08-04 RX ORDER — DEXTROSE 50 % IN WATER 50 %
25 SYRINGE (ML) INTRAVENOUS ONCE
Refills: 0 | Status: DISCONTINUED | OUTPATIENT
Start: 2022-08-04 | End: 2022-08-08

## 2022-08-04 RX ORDER — HALOPERIDOL DECANOATE 100 MG/ML
1 INJECTION INTRAMUSCULAR
Qty: 0 | Refills: 0 | DISCHARGE

## 2022-08-04 RX ORDER — INSULIN LISPRO 100/ML
VIAL (ML) SUBCUTANEOUS
Refills: 0 | Status: DISCONTINUED | OUTPATIENT
Start: 2022-08-04 | End: 2022-08-04

## 2022-08-04 RX ORDER — TRIHEXYPHENIDYL HCL 2 MG
1.5 TABLET ORAL
Qty: 0 | Refills: 0 | DISCHARGE

## 2022-08-04 RX ORDER — TICAGRELOR 90 MG/1
90 TABLET ORAL EVERY 12 HOURS
Refills: 0 | Status: DISCONTINUED | OUTPATIENT
Start: 2022-08-04 | End: 2022-08-04

## 2022-08-04 RX ORDER — GLUCAGON INJECTION, SOLUTION 0.5 MG/.1ML
1 INJECTION, SOLUTION SUBCUTANEOUS ONCE
Refills: 0 | Status: DISCONTINUED | OUTPATIENT
Start: 2022-08-04 | End: 2022-08-08

## 2022-08-04 RX ORDER — ACETAMINOPHEN 500 MG
975 TABLET ORAL ONCE
Refills: 0 | Status: COMPLETED | OUTPATIENT
Start: 2022-08-04 | End: 2022-08-04

## 2022-08-04 RX ORDER — METFORMIN HYDROCHLORIDE 850 MG/1
1 TABLET ORAL
Qty: 0 | Refills: 0 | DISCHARGE

## 2022-08-04 RX ORDER — CHOLECALCIFEROL (VITAMIN D3) 125 MCG
1 CAPSULE ORAL
Qty: 0 | Refills: 0 | DISCHARGE

## 2022-08-04 RX ORDER — HEPARIN SODIUM 5000 [USP'U]/ML
INJECTION INTRAVENOUS; SUBCUTANEOUS
Qty: 25000 | Refills: 0 | Status: DISCONTINUED | OUTPATIENT
Start: 2022-08-04 | End: 2022-08-05

## 2022-08-04 RX ORDER — EMPAGLIFLOZIN 10 MG/1
1 TABLET, FILM COATED ORAL
Qty: 0 | Refills: 0 | DISCHARGE

## 2022-08-04 RX ORDER — TICAGRELOR 90 MG/1
180 TABLET ORAL ONCE
Refills: 0 | Status: COMPLETED | OUTPATIENT
Start: 2022-08-04 | End: 2022-08-04

## 2022-08-04 RX ORDER — MENTHOL AND METHYL SALICYLATE 10; 30 G/100G; G/100G
1 CREAM TOPICAL
Qty: 0 | Refills: 0 | DISCHARGE

## 2022-08-04 RX ORDER — LINAGLIPTIN 5 MG/1
1 TABLET, FILM COATED ORAL
Qty: 0 | Refills: 0 | DISCHARGE

## 2022-08-04 RX ORDER — BENZTROPINE MESYLATE 1 MG
1 TABLET ORAL
Qty: 0 | Refills: 0 | DISCHARGE

## 2022-08-04 RX ORDER — DIPHENHYDRAMINE HCL 50 MG
1 CAPSULE ORAL
Qty: 0 | Refills: 0 | DISCHARGE

## 2022-08-04 RX ORDER — DEXTROSE 50 % IN WATER 50 %
12.5 SYRINGE (ML) INTRAVENOUS ONCE
Refills: 0 | Status: DISCONTINUED | OUTPATIENT
Start: 2022-08-04 | End: 2022-08-08

## 2022-08-04 RX ORDER — ATORVASTATIN CALCIUM 80 MG/1
1 TABLET, FILM COATED ORAL
Qty: 0 | Refills: 0 | DISCHARGE

## 2022-08-04 RX ORDER — SODIUM CHLORIDE 9 MG/ML
80 INJECTION INTRAMUSCULAR; INTRAVENOUS; SUBCUTANEOUS ONCE
Refills: 0 | Status: DISCONTINUED | OUTPATIENT
Start: 2022-08-04 | End: 2022-08-04

## 2022-08-04 RX ORDER — DOCUSATE SODIUM 100 MG
2 CAPSULE ORAL
Qty: 0 | Refills: 0 | DISCHARGE

## 2022-08-04 RX ORDER — INSULIN LISPRO 100/ML
VIAL (ML) SUBCUTANEOUS AT BEDTIME
Refills: 0 | Status: DISCONTINUED | OUTPATIENT
Start: 2022-08-04 | End: 2022-08-08

## 2022-08-04 RX ORDER — HEPARIN SODIUM 5000 [USP'U]/ML
5000 INJECTION INTRAVENOUS; SUBCUTANEOUS ONCE
Refills: 0 | Status: COMPLETED | OUTPATIENT
Start: 2022-08-04 | End: 2022-08-04

## 2022-08-04 RX ORDER — LANOLIN ALCOHOL/MO/W.PET/CERES
3 CREAM (GRAM) TOPICAL AT BEDTIME
Refills: 0 | Status: DISCONTINUED | OUTPATIENT
Start: 2022-08-04 | End: 2022-08-08

## 2022-08-04 RX ORDER — ACETAMINOPHEN 500 MG
650 TABLET ORAL EVERY 6 HOURS
Refills: 0 | Status: DISCONTINUED | OUTPATIENT
Start: 2022-08-04 | End: 2022-08-08

## 2022-08-04 RX ORDER — DEXTROSE 50 % IN WATER 50 %
15 SYRINGE (ML) INTRAVENOUS ONCE
Refills: 0 | Status: DISCONTINUED | OUTPATIENT
Start: 2022-08-04 | End: 2022-08-08

## 2022-08-04 RX ORDER — INSULIN LISPRO 100/ML
VIAL (ML) SUBCUTANEOUS EVERY 6 HOURS
Refills: 0 | Status: DISCONTINUED | OUTPATIENT
Start: 2022-08-04 | End: 2022-08-05

## 2022-08-04 RX ORDER — OLANZAPINE 15 MG/1
20 TABLET, FILM COATED ORAL DAILY
Refills: 0 | Status: DISCONTINUED | OUTPATIENT
Start: 2022-08-04 | End: 2022-08-08

## 2022-08-04 RX ORDER — ASPIRIN/CALCIUM CARB/MAGNESIUM 324 MG
81 TABLET ORAL DAILY
Refills: 0 | Status: DISCONTINUED | OUTPATIENT
Start: 2022-08-04 | End: 2022-08-08

## 2022-08-04 RX ADMIN — OLANZAPINE 20 MILLIGRAM(S): 15 TABLET, FILM COATED ORAL at 22:04

## 2022-08-04 RX ADMIN — ATORVASTATIN CALCIUM 40 MILLIGRAM(S): 80 TABLET, FILM COATED ORAL at 22:05

## 2022-08-04 RX ADMIN — SODIUM CHLORIDE 80 MILLILITER(S): 9 INJECTION INTRAMUSCULAR; INTRAVENOUS; SUBCUTANEOUS at 14:09

## 2022-08-04 RX ADMIN — Medication 1 MILLIGRAM(S): at 03:04

## 2022-08-04 RX ADMIN — Medication 81 MILLIGRAM(S): at 22:05

## 2022-08-04 RX ADMIN — Medication 7.5 MILLIGRAM(S): at 22:04

## 2022-08-04 RX ADMIN — HEPARIN SODIUM 1200 UNIT(S)/HR: 5000 INJECTION INTRAVENOUS; SUBCUTANEOUS at 20:05

## 2022-08-04 RX ADMIN — HEPARIN SODIUM 1000 UNIT(S)/HR: 5000 INJECTION INTRAVENOUS; SUBCUTANEOUS at 06:16

## 2022-08-04 RX ADMIN — HEPARIN SODIUM 5000 UNIT(S): 5000 INJECTION INTRAVENOUS; SUBCUTANEOUS at 06:19

## 2022-08-04 RX ADMIN — HEPARIN SODIUM 1400 UNIT(S)/HR: 5000 INJECTION INTRAVENOUS; SUBCUTANEOUS at 22:10

## 2022-08-04 RX ADMIN — TICAGRELOR 180 MILLIGRAM(S): 90 TABLET ORAL at 05:02

## 2022-08-04 RX ADMIN — HEPARIN SODIUM 1200 UNIT(S)/HR: 5000 INJECTION INTRAVENOUS; SUBCUTANEOUS at 14:10

## 2022-08-04 RX ADMIN — HEPARIN SODIUM 6000 UNIT(S): 5000 INJECTION INTRAVENOUS; SUBCUTANEOUS at 22:10

## 2022-08-04 RX ADMIN — Medication 975 MILLIGRAM(S): at 03:01

## 2022-08-04 NOTE — H&P ADULT - NSHPLABSRESULTS_GEN_ALL_CORE
13.7   8.25  )-----------( 191      ( 04 Aug 2022 03:05 )             41.0     08-04    130<L>  |  93<L>  |  12  ----------------------------<  192<H>  4.4   |  26  |  0.85    Ca    9.5      04 Aug 2022 03:05    TPro  7.2  /  Alb  4.2  /  TBili  0.3  /  DBili  x   /  AST  40  /  ALT  22  /  AlkPhos  59  08-04    EKG    Trop 154    Tox screen Cocaine    CXR Clear 13.7   8.25  )-----------( 191      ( 04 Aug 2022 03:05 )             41.0     08-04    130<L>  |  93<L>  |  12  ----------------------------<  192<H>  4.4   |  26  |  0.85    Ca    9.5      04 Aug 2022 03:05    TPro  7.2  /  Alb  4.2  /  TBili  0.3  /  DBili  x   /  AST  40  /  ALT  22  /  AlkPhos  59  08-04    EKG NSR 64, poor r wave progression diffuse 0.5 mm ROBLES    Trop 154 > 273    Tox screen Cocaine    CXR Clear Vital Signs Last 24 Hrs  T(C): 36.9 (04 Aug 2022 08:56), Max: 36.9 (04 Aug 2022 05:54)  T(F): 98.5 (04 Aug 2022 08:56), Max: 98.5 (04 Aug 2022 05:54)  HR: 70 (04 Aug 2022 08:56) (69 - 74)  BP: 147/91 (04 Aug 2022 08:56) (140/94 - 166/96)  BP(mean): --  RR: 18 (04 Aug 2022 08:56) (16 - 20)  SpO2: 100% (04 Aug 2022 08:56) (98% - 100%)    Parameters below as of 04 Aug 2022 08:56  Patient On (Oxygen Delivery Method): room air          13.7   8.25  )-----------( 191      ( 04 Aug 2022 03:05 )             41.0     08-04    130<L>  |  93<L>  |  12  ----------------------------<  192<H>  4.4   |  26  |  0.85    Ca    9.5      04 Aug 2022 03:05    TPro  7.2  /  Alb  4.2  /  TBili  0.3  /  DBili  x   /  AST  40  /  ALT  22  /  AlkPhos  59  08-04    EKG NSR 64, poor r wave progression diffuse 0.5 mm ROBLES    Trop 154 > 273    Tox screen Cocaine    CXR Clear

## 2022-08-04 NOTE — ED ADULT NURSE REASSESSMENT NOTE - NS ED NURSE REASSESS COMMENT FT1
Pt a&ox4, NSR on cardiac monitor, Respirations are even and unlabored, no signs of respiratory distress. Pt denying finger sticks at this time, pt educated about the risk and benefits, team made aware.
Received report from PM RN. Patient is alert and oriented times three. Patient ate breakfast and is now NPO. Patient is comfortable, NSR on cardiac monitor and transferred to [facility] 3. Report given to RN. Trop drawn and FS done.   BERTIN Monteiro
Pt a&ox4, NSR on cardiac monitor, resting in bed, no endorsing CP at this time. Respirations are even and unlabored, no signs of respiratory distress.

## 2022-08-04 NOTE — H&P ADULT - PROBLEM SELECTOR PLAN 1
- Admit to medicine on telemetry  - Atypical chest pain in the setting of cocaine use for one day  - Initial troponin 154 and EKG ...  - Utox + for cocaine  - Differential includes ACS vs vasospastic angina  - S/P brilinta load, ASA load, and on heparin gtt - will check price and continue brilinta and heparin gtt  - Possibility this could be a prolonged vasospastic event from cocaine use; however less likely as Troponin normally not elevated in vasospasm.  - For now will avoid beta-blocker given cocaine abuse; patient counseled on abstaining from cocaine use.  - Check A1C and lipids - aggressive statin use - Admit to medicine on telemetry  - Atypical chest pain in the setting of cocaine use for one day  - Initial troponin 154 and EKG grossly unchanged from baseline  - Utox + for cocaine  - Differential includes ACS vs vasospastic angina  - S/P brilinta load, ASA load, and on heparin gtt - will check price and continue brilinta and heparin gtt  - Possibility this could be a prolonged vasospastic event from cocaine use; however less likely as Troponin normally not elevated in vasospasm.  - For now will avoid beta-blocker given cocaine abuse; patient counseled on abstaining from cocaine use.  - Check A1C and lipids - aggressive statin use  - D/W Dr. Madden and will Plan for cardiac Cath with Dr. Jacki morse today. - Admit to medicine on telemetry  - Atypical chest pain in the setting of cocaine use for one day  - Initial troponin 154 and EKG grossly unchanged from baseline  - Utox + for cocaine  - Differential includes ACS vs vasospastic angina  - S/P brilinta load, ASA load, and on heparin gtt - Would use plavix in place of Brilinta given cost and daily dosing to improve compliance; continue heparin gtt  - Possibility this could be a prolonged vasospastic event from cocaine use; however less likely as Troponin normally not elevated in vasospasm.  - For now will avoid beta-blocker given cocaine abuse; patient counseled on abstaining from cocaine use.  - Check A1C and lipids - aggressive statin use  - TTE ordered  - D/W Dr. Madden and will Plan for cardiac Cath with Dr. Jacki morse today.

## 2022-08-04 NOTE — ED ADULT TRIAGE NOTE - CHIEF COMPLAINT QUOTE
Pt. brought to [facility] ED by IVAN for chest pain that started 2 hours ago, mid-sternum and bilateral shoulder pain.  mg given by EMS. PMH: psych, HTN, hyperlipidemia, cocaine use. Pt. is from [facility]. Takes metformin, bisacodyl, and zyprexa. Pt. brought to [facility] ED by IVAN for chest pain that started 2 hours ago, mid-sternum and bilateral shoulder pain.  mg given by EMS. PMH: psych, HTN, hyperlipidemia, cocaine use. Pt. is from [facility]. Takes metformin, bisacodyl, and zyprexa. Refusing FS at triage.

## 2022-08-04 NOTE — H&P ADULT - PROBLEM SELECTOR PLAN 2
- UTox positive fo cocaine; patient reports use on 8/1 and 8/2.  - Counseled on abstaining from further cocaine use.

## 2022-08-04 NOTE — H&P ADULT - NSICDXPASTMEDICALHX_GEN_ALL_CORE_FT
PAST MEDICAL HISTORY:  Bipolar disorder     DM (diabetes mellitus)     HLD (hyperlipidemia)     HTN (hypertension)     Schizoaffective disorder

## 2022-08-04 NOTE — ED PROVIDER NOTE - PROGRESS NOTE DETAILS
TIA Bautista: Spoke with [facility] however advised pt was admitted under Dr Madden, Spoke to Dr Madden who agrees with plan of ASA, Brilinta and Heparin, advised to admit to the Hospitalist. Pt accepted by Dr. Shetty.

## 2022-08-04 NOTE — CONSULT NOTE ADULT - ASSESSMENT
Patient is a 58 yo man with history of bipolar disorder/schizophrenia, HTN, HLD, DM2, cocaine use, active smoker and essential tremor presents with chest pain.     He states that on 8/3/22 evening, he developed sudden-onset (10/10) left-sided chest pain, described as sharp, non-radiating, and constant that is non-exertional and minimally related to position (was unable to lay in bed due to pain but not necessarily improved when sitting up).     He also reports he had used cocaine and smoked cigarettes for many years.   Last cocaine use was on 8/2/22.    In ED, noted to have elevated troponins.  Started on heparin gtt, DAPT, statin.  When evaluated in the ED this AM, he appeared clinically and hemodynamically stable.    Will arrange for LHC for NSTEMI.  Possible underlying etiology may also be coronary vasospasm although patient reports last cocaine use several days ago.

## 2022-08-04 NOTE — H&P ADULT - ASSESSMENT
57 y.o male pmh of bipolar disorder vs schizophrenia, HTN, HLD, DM, cocaine use, active smoker and essential tremor presents with chest pain admitted for NSTEMI vs vasospastic angina.

## 2022-08-04 NOTE — H&P ADULT - NSHPPHYSICALEXAM_GEN_ALL_CORE
PHYSICAL EXAM:  GENERAL: NAD, well-developed  HEAD:  Atraumatic, Normocephalic  EYES: EOMI, PERRLA, conjunctiva and sclera clear  NECK: Supple, No JVD  CHEST/LUNG: Clear to auscultation bilaterally; No wheeze/rhonchi/rale  HEART: Regular rate and rhythm; No murmurs, rubs, or gallops  ABDOMEN: Soft, Nontender, Nondistended; Bowel sounds present  EXTREMITIES:  2+ Peripheral Pulses, No clubbing, cyanosis, or edema  PSYCH: AAOx3  NEUROLOGY: non-focal  SKIN: No rashes or lesions PHYSICAL EXAM:  GENERAL: NAD, well-developed, lip smacking noted   HEAD:  Atraumatic, Normocephalic  EYES: EOMI, PERRLA, conjunctiva and sclera clear  NECK: Supple, No JVD  CHEST/LUNG: Clear to auscultation bilaterally; No wheeze/rhonchi/rale  HEART: Regular rate and rhythm; No murmurs, rubs, or gallops  ABDOMEN: Soft, Nontender, Nondistended; Bowel sounds present  EXTREMITIES:  2+ Peripheral Pulses, No clubbing, cyanosis, or edema  PSYCH: AAOx3  NEUROLOGY: non-focal  SKIN: No rashes or lesions

## 2022-08-04 NOTE — ED PROVIDER NOTE - CRITICAL CARE ATTENDING CONTRIBUTION TO CARE
58 yo M hx schizoaffective disorder (states not on meds), presenting with complaints of chest pain in setting of cocaine use x 2 days. Received ASA from EMS, still with current chest pain. No STEMI on EKG. Tx with benzo, continue with cardiac monitor, likely tba.

## 2022-08-04 NOTE — H&P ADULT - HISTORY OF PRESENT ILLNESS
57 y.o male pmh of bipolar disorder vs schizophrenia, HTN, HLD, DM, cocaine use, active smoker and essential tremor presents with chest pain. patient states that on 8/3/22 in evening he had sudden onset of 10/10 left sided chest pain, described as sharp, non-radiating, and constant that is non-exertional and minimally related to position (was unable to lay in bed due to pain but not necessarily improved when sitting up). he reports he has used cocaine and smoked cigarettes his whole life. Last cocaine use was on 8/2; denies any use immediately prior to onset of pain. He has never had a pain like this before. Denies fever, chills, nausea, vomiting, head ache, edema, palpitations sob, orthopnea, pnd, ad pain, diarrhea, brbpr, melena, dysuria, hematuria, dizziness, fall, syncope, head trauma, visual change, hearing change or recent travel.

## 2022-08-04 NOTE — ED PROVIDER NOTE - CLINICAL SUMMARY MEDICAL DECISION MAKING FREE TEXT BOX
56 Y/O M PMH HTN HLD Schizophrenia Cocaine use (states last used on Monday and Tuesday) presents with chest pain that he states began 4 hours prior to ED arrival. States the pain feels sharp and goes from his chest to both arms. Plan is EKG and Troponin to eval for NSTEMI, labs to eval for anemia or electrolyte disturbance, likely admission for further cardiac evaluation.

## 2022-08-04 NOTE — ED ADULT NURSE NOTE - OBJECTIVE STATEMENT
Pt A&Ox4 ambulatory at baseline, PMH Cocaine abuse, schizoaffective disorder, DM, HTN, HLD, presenting to the ED (RM 28) c/o chest pain. Pt states developed chest pain yesterday evening, Pt states pain radiates to both shoulder, R >L. Pt also endorsed nausea, no vomiting episodes. Pt admits to cocaine use, states last use was yesterday evening. Denies cp, sob, palpitations, dizziness, lightheadedness, n/v/d, fever, chills, cough, HA, blurry vision. Respirations are even and unlabored, pt placed on CM, 18G IV RAC, labs sent, COVID sent, medicated as orders, Safety precautions implemented as per protocol, awaiting further MD orders, will continue to monitor.

## 2022-08-04 NOTE — ED ADULT NURSE NOTE - NSIMPLEMENTINTERV_GEN_ALL_ED
Implemented All Universal Safety Interventions:  Finger to call system. Call bell, personal items and telephone within reach. Instruct patient to call for assistance. Room bathroom lighting operational. Non-slip footwear when patient is off stretcher. Physically safe environment: no spills, clutter or unnecessary equipment. Stretcher in lowest position, wheels locked, appropriate side rails in place.

## 2022-08-04 NOTE — ED PROVIDER NOTE - OBJECTIVE STATEMENT
56 Y/O M PMH HTN HLD 56 Y/O M PMH HTN HLD Schizophrenia Cocaine use (states last used on Monday and Tuesday) presents with chest pain that he states began 1 day prior to ED arrival, pt does not recall exact time of onset. Pt states the pain feels sharp and goes from his chest to both arms. Pt was given  by EMS. States he still has some chest pain. Pt states he is a smoker. Pt denies any other sx or acute complaints.

## 2022-08-04 NOTE — H&P ADULT - NSHPREVIEWOFSYSTEMS_GEN_ALL_CORE
CONSTITUTIONAL: No fever; No chills; No night sweats; No weight loss; No fatigue  EYES: No eye pain; No blurry vision  ENMT:  No difficulty hearing; No sinus or throat pain  NECK: No pain or stiffness  RESPIRATORY: No cough; No wheezing; No hemoptysis; No shortness of breath; No sputum production  CARDIOVASCULAR: see above  GASTROINTESTINAL: No abdominal pain; No nausea; No vomiting; No hematemesis; No diarrhea; No constipation. No melena or hematochezia.  GENITOURINARY: No dysuria; No frequency; No hematuria; No incontinence  NEUROLOGICAL: No headaches; No loss of strength; No numbness  SKIN: No itching/burning; No rashes or lesions   MUSCULOSKELETAL: No joint pain or swelling; No muscle, back, or extremity pain  HEME/LYMPH: No easy bruising, or bleeding gums

## 2022-08-04 NOTE — CONSULT NOTE ADULT - SUBJECTIVE AND OBJECTIVE BOX
Cardiology/Vascular Medicine Inpatient Consultation Note      HISTORY OF PRESENT ILLNESS:  HPI:          Allergies  No Known Allergies    MEDICATIONS:  heparin   Injectable 6000 Unit(s) IV Push every 6 hours PRN  heparin  Infusion.  Unit(s)/Hr IV Continuous <Continuous>  acetaminophen     Tablet .. 650 milliGRAM(s) Oral every 6 hours PRN  melatonin 3 milliGRAM(s) Oral at bedtime PRN    PAST MEDICAL & SURGICAL HISTORY:  DM (diabetes mellitus)  HTN (hypertension)  HLD (hyperlipidemia)  Schizoaffective disorder    No significant past surgical history    FAMILY HISTORY:  No pertinent family history in first degree relatives    SOCIAL HISTORY:    As above, as per chart notes    REVIEW OF SYSTEMS:  As above, as per chart notes    PHYSICAL EXAM:  T(C): 36.9 (08-04-22 @ 05:54), Max: 36.9 (08-04-22 @ 05:54)  HR: 69 (08-04-22 @ 05:54) (69 - 74)  BP: 140/94 (08-04-22 @ 05:54) (140/94 - 166/96)  RR: 20 (08-04-22 @ 05:54) (16 - 20)  SpO2: 98% (08-04-22 @ 05:54) (98% - 100%)    Appearance: NAD, laying flat in bed	  HEENT:   No JVD  Cardiovascular: Normal S1 S2  Respiratory: Decreased breath sounds bilaterallt  Psychiatry: Awake, alert, answers questions  Neurologic: Non-focal  Extremities: No edema      LABS:	 	                          13.7   8.25  )-----------( 191      ( 04 Aug 2022 03:05 )             41.0     08-04    130<L>  |  93<L>  |  12  ----------------------------<  192<H>  4.4   |  26  |  0.85    Ca    9.5      04 Aug 2022 03:05    TPro  7.2  /  Alb  4.2  /  TBili  0.3  /  DBili  x   /  AST  40  /  ALT  22  /  AlkPhos  59  08-04    < from: Xray Chest 2 Views PA/Lat (08.04.22 @ 03:25) >    ACC: [id number] EXAM:  XR CHEST PA LAT 2V                          PROCEDURE DATE:  08/04/2022          INTERPRETATION:  INDICATION: Chest pain    COMPARISON: Chest x-ray 6/22/2022    FINDINGS:  Heart/Vascular: Heart size is normal.  Pulmonary: Clearlungs. No pneumothorax or pleural effusions.  Bones: No acute osseous abnormalities.    Impression:  Clear lungs.    --- End of Report ---          BREANNA ROJAS MD; Resident Radiologist  This document has been electronically signed.  IVIS MARTINEZ MD; Attending Radiologist  This document has been electronically signed. Aug  4 2022  5:00AM    < end of copied text >  < from: Transthoracic Echocardiogram (06.23.20 @ 07:47) >    Patient name: SEVERO CHIRINOS  YOB: 1965   Age: 55 (M)   MR#: [id number]  Study Date: 6/23/2020  [facility]  Study quality: Technically Fair  Referring Physician: Pedro Madden MD  Blood Pressure: 114/74 mmHg  Height: 173 cm  Weight: 100 kg  BSA: 2.1 m2  ------------------------------------------------------------------------  PROCEDURE: Transthoracic echocardiogram with 2-D, M-Mode  and complete spectral and color flow Doppler.  INDICATION: Chestpain, unspecified (R07.9)  ------------------------------------------------------------------------  DIMENSIONS:  Dimensions:     Normal Values:  LA:     3.1 cm    2.0 - 4.0 cm  Ao:     3.7 cm    2.0 - 3.8 cm  SEPTUM: 0.8 cm    0.6 - 1.2 cm  PWT:    0.7 cm    0.6 - 1.1 cm  LVIDd:  4.4 cm    3.0 - 5.6 cm  LVIDs:  2.9 cm    1.8 - 4.0 cm  Derived Variables:  LVMI: 47 g/m2  RWT: 0.31  Fractional short: 34 %  Ejection Fraction (Teicholtz): 63 %  ------------------------------------------------------------------------  OBSERVATIONS:  Mitral Valve: Normal mitral valve. Minimal mitral  regurgitation.  Aortic Root: Normal aortic root.  Aortic Valve: Normal trileaflet aortic valve.  Left Atrium: Normal left atrium.  LA volume index = 22  cc/m2.  Left Ventricle: Normal left ventricular systolic function.  No segmental wall motion abnormalities. Normal left  ventricular internal dimensions and wall thicknesses.  Right Heart: Normal right atrium. Normal right ventricular  size and function. Normal tricuspid valve.  Minimal  tricuspid regurgitation. Normal pulmonic valve.  Minimal  pulmonic regurgitation.  Pericardium/PleuraNormal pericardium with no pericardial  effusion.  ------------------------------------------------------------------------  CONCLUSIONS:  1. Normal mitral valve. Minimal mitral regurgitation.  2. Normal left ventricular internal dimensions and wall  thicknesses.  3. Normal left ventricular systolic function. No segmental  wall motion abnormalities.  4. Normal right ventricular size and function.  ------------------------------------------------------------------------  Confirmed on  6/23/2020 - 09:27:21 by Lupillo Mccullough M.D.  ------------------------------------------------------------------------    < end of copied text >   Cardiology/Vascular Medicine Inpatient Consultation Note      HISTORY OF PRESENT ILLNESS:    Patient is a 56 yo man with history of bipolar disorder/schizophrenia, HTN, HLD, DM2, cocaine use, active smoker and essential tremor presents with chest pain.     He states that on 8/3/22 evening, he developed sudden-onset (10/10) left-sided chest pain, described as sharp, non-radiating, and constant that is non-exertional and minimally related to position (was unable to lay in bed due to pain but not necessarily improved when sitting up).     He also reports he had used cocaine and smoked cigarettes for many years.   Last cocaine use was on 8/2/22.    In ED, noted to have elevated troponins.  Started on heparin gtt, DAPT, statin.  When evaluated in the ED this AM, he appeared clinically and hemodynamically stable.    Will arrange for LHC for NSTEMI.  Possible underlying etiology may also be coronary vasospasm although patient reports last cocaine use several days ago.      Allergies  No Known Allergies    MEDICATIONS:  heparin   Injectable 6000 Unit(s) IV Push every 6 hours PRN  heparin  Infusion.  Unit(s)/Hr IV Continuous <Continuous>  acetaminophen     Tablet .. 650 milliGRAM(s) Oral every 6 hours PRN  melatonin 3 milliGRAM(s) Oral at bedtime PRN    PAST MEDICAL & SURGICAL HISTORY:  DM (diabetes mellitus)  HTN (hypertension)  HLD (hyperlipidemia)  Schizoaffective disorder    No significant past surgical history    FAMILY HISTORY:  No pertinent family history in first degree relatives    SOCIAL HISTORY:    As above, as per chart notes    REVIEW OF SYSTEMS:  As above, as per chart notes    PHYSICAL EXAM:  T(C): 36.9 (08-04-22 @ 05:54), Max: 36.9 (08-04-22 @ 05:54)  HR: 69 (08-04-22 @ 05:54) (69 - 74)  BP: 140/94 (08-04-22 @ 05:54) (140/94 - 166/96)  RR: 20 (08-04-22 @ 05:54) (16 - 20)  SpO2: 98% (08-04-22 @ 05:54) (98% - 100%)    Appearance: NAD, laying flat in bed	  HEENT:   No JVD  Cardiovascular: Normal S1 S2  Respiratory: Decreased breath sounds bilaterallt  Psychiatry: Awake, alert, answers questions  Neurologic: Non-focal  Extremities: No edema      LABS:	 	                          13.7   8.25  )-----------( 191      ( 04 Aug 2022 03:05 )             41.0     08-04    130<L>  |  93<L>  |  12  ----------------------------<  192<H>  4.4   |  26  |  0.85    Ca    9.5      04 Aug 2022 03:05    TPro  7.2  /  Alb  4.2  /  TBili  0.3  /  DBili  x   /  AST  40  /  ALT  22  /  AlkPhos  59  08-04    < from: Xray Chest 2 Views PA/Lat (08.04.22 @ 03:25) >    ACC: [id number] EXAM:  XR CHEST PA LAT 2V                          PROCEDURE DATE:  08/04/2022          INTERPRETATION:  INDICATION: Chest pain    COMPARISON: Chest x-ray 6/22/2022    FINDINGS:  Heart/Vascular: Heart size is normal.  Pulmonary: Clearlungs. No pneumothorax or pleural effusions.  Bones: No acute osseous abnormalities.    Impression:  Clear lungs.    --- End of Report ---          BREANNA ROJAS MD; Resident Radiologist  This document has been electronically signed.  IVIS MARTINEZ MD; Attending Radiologist  This document has been electronically signed. Aug  4 2022  5:00AM    < end of copied text >  < from: Transthoracic Echocardiogram (06.23.20 @ 07:47) >    Patient name: SEVERO CHIRINOS  YOB: 1965   Age: 55 (M)   MR#: [id number]  Study Date: 6/23/2020  [facility]  Study quality: Technically Fair  Referring Physician: Pedro Madden MD  Blood Pressure: 114/74 mmHg  Height: 173 cm  Weight: 100 kg  BSA: 2.1 m2  ------------------------------------------------------------------------  PROCEDURE: Transthoracic echocardiogram with 2-D, M-Mode  and complete spectral and color flow Doppler.  INDICATION: Chestpain, unspecified (R07.9)  ------------------------------------------------------------------------  DIMENSIONS:  Dimensions:     Normal Values:  LA:     3.1 cm    2.0 - 4.0 cm  Ao:     3.7 cm    2.0 - 3.8 cm  SEPTUM: 0.8 cm    0.6 - 1.2 cm  PWT:    0.7 cm    0.6 - 1.1 cm  LVIDd:  4.4 cm    3.0 - 5.6 cm  LVIDs:  2.9 cm    1.8 - 4.0 cm  Derived Variables:  LVMI: 47 g/m2  RWT: 0.31  Fractional short: 34 %  Ejection Fraction (Teicholtz): 63 %  ------------------------------------------------------------------------  OBSERVATIONS:  Mitral Valve: Normal mitral valve. Minimal mitral  regurgitation.  Aortic Root: Normal aortic root.  Aortic Valve: Normal trileaflet aortic valve.  Left Atrium: Normal left atrium.  LA volume index = 22  cc/m2.  Left Ventricle: Normal left ventricular systolic function.  No segmental wall motion abnormalities. Normal left  ventricular internal dimensions and wall thicknesses.  Right Heart: Normal right atrium. Normal right ventricular  size and function. Normal tricuspid valve.  Minimal  tricuspid regurgitation. Normal pulmonic valve.  Minimal  pulmonic regurgitation.  Pericardium/PleuraNormal pericardium with no pericardial  effusion.  ------------------------------------------------------------------------  CONCLUSIONS:  1. Normal mitral valve. Minimal mitral regurgitation.  2. Normal left ventricular internal dimensions and wall  thicknesses.  3. Normal left ventricular systolic function. No segmental  wall motion abnormalities.  4. Normal right ventricular size and function.  ------------------------------------------------------------------------  Confirmed on  6/23/2020 - 09:27:21 by Lupillo Mccullough M.D.  ------------------------------------------------------------------------    < end of copied text >

## 2022-08-04 NOTE — ED ADULT NURSE NOTE - CHIEF COMPLAINT QUOTE
Pt. brought to [facility] ED by IVAN for chest pain that started 2 hours ago, mid-sternum and bilateral shoulder pain.  mg given by EMS. PMH: psych, HTN, hyperlipidemia, cocaine use. Pt. is from [facility]. Takes metformin, bisacodyl, and zyprexa. Refusing FS at triage. No

## 2022-08-04 NOTE — H&P ADULT - NS ATTEND AMEND GEN_ALL_CORE FT
56 yo male patient with PMHx Bipolar disorder vs schizophrenia, HTN, HLD, DM, cocaine use, active smoker and essential tremor presents to ED for chest pain of one day duration. Admits to recent cocaine use one day prior to chest pain. Admitted for NSTEMI vs. vasospastic angina.   PE: mostly unremarkable, AOx3, heart normal S1/S2, Lungs CTA B/L, abdomen nontender, +BS, no edema noted on LE B/L  Labs significant for rising troponin (154->273), ECG remarkable for NSR, diffuse TWI, left axis deviation     #1 NSTEMI vs. vasospastic angina  Loaded with Brillinta and ASA. Currently on Hep gtt, statin, lipid panel and hgbA1c pending. 3rd trop pending. Cardio consulted and plan for cardiac cath later today with Dr.Avneet De. NPO for now     I have personally seen and examined patient. I discussed the case with patient and Ildefonso WEBER, and agree with findings and plan as noted above. 56 yo male patient with PMHx Bipolar disorder vs schizophrenia, HTN, HLD, DM, cocaine use, active smoker and essential tremor presents to ED for chest pain of one day duration. Admits to recent cocaine use one day prior to chest pain. Admitted for NSTEMI vs. vasospastic angina.   PE: mostly unremarkable, AOx3, heart normal S1/S2, Lungs CTA B/L, abdomen nontender, +BS, no edema noted on LE B/L  Labs significant for rising troponin (154->273), ECG remarkable for NSR HR 64, left axis deviation, diffuse 0.5 mm ROBLES, TWI in Lead III and aVF    #1 NSTEMI vs. vasospastic angina  Loaded with Brillinta and ASA. Currently on Hep gtt, statin, lipid panel and hgbA1c pending. 3rd trop pending. Cardio consulted and plan for cardiac cath later today with Dr.Avneet De. NPO for now     I have personally seen and examined patient. I discussed the case with patient and PAIldefonso, and agree with findings and plan as noted above. 56 yo male patient with PMHx Bipolar disorder vs schizophrenia, HTN, HLD, DM, cocaine use, active smoker and essential tremor presents to ED for chest pain of one day duration. Admits to recent cocaine use one day prior to chest pain. Admitted for NSTEMI vs. vasospastic angina.   PE: mostly unremarkable, AOx3, heart normal S1/S2, Lungs CTA B/L, abdomen nontender, +BS, no edema noted on LE B/L  Labs significant for rising troponin (154->273->388), Lipid panel: /HDL 34/, hgbA1c 6.8   /CKMB 37.9ECG remarkable for NSR HR 64, left axis deviation, diffuse 0.5 mm ROBLES, TWI in Lead III and aVF    #1 NSTEMI vs. vasospastic angina  Loaded with Brillinta and ASA. Currently on Hep gtt, statin, lipid panel and hgbA1c pending. 3rd trop pending. Cardio consulted and plan for cardiac cath later today with Dr.Avneet De. NPO for now     I have personally seen and examined patient. I discussed the case with patient and PAIldefonso, and agree with findings and plan as noted above. 56 yo male patient with PMHx Bipolar disorder vs schizophrenia, HTN, HLD, DM, cocaine use, active smoker and essential tremor presents to ED for chest pain of one day duration. Admits to recent cocaine use one day prior to chest pain. Admitted for NSTEMI vs. vasospastic angina.   PE: mostly unremarkable, AOx3, heart normal S1/S2, Lungs CTA B/L, abdomen nontender, +BS, no edema noted on LE B/L  Labs significant for rising troponin (154->273->388), Lipid panel: /HDL 34/, hgbA1c 6.8, Na 130 (corrected for Hyperglycemia 132)  /CKMB 37.9ECG remarkable for NSR HR 64, left axis deviation, diffuse 0.5 mm ROBLES, TWI in Lead III and aVF    #1 NSTEMI vs. vasospastic angina  Loaded with Brillinta and ASA. Currently on Hep gtt, statin, lipid panel and hgbA1c pending. 3rd trop pending. Cardio consulted and plan for cardiac cath later today with Dr.Avneet De. NPO for now in anticipation of cardiac cath     I have personally seen and examined patient. I discussed the case with patient and PA, Ildefonso Townsend, and agree with findings and plan as noted above. 58 yo male patient with PMHx Bipolar disorder vs schizophrenia, HTN, HLD, DM, cocaine use, active smoker and essential tremor presents to ED for chest pain of one day duration. Admits to recent cocaine use one day prior to chest pain. Admitted for NSTEMI vs. vasospastic angina.   PE: mostly unremarkable, AOx3, heart normal S1/S2, Lungs CTA B/L, abdomen nontender, +BS, no edema noted on LE B/L  Labs significant for rising troponin (154->273->388), Lipid panel: /HDL 34/, hgbA1c 6.8, Na 130 (corrected for Hyperglycemia 132)  /CKMB 37.9ECG remarkable for NSR HR 64, left axis deviation, diffuse 0.5 mm ROBLES, TWI in Lead III and aVF    #1 NSTEMI vs. vasospastic angina  RF: DM, HTN, +smoking hx, HLD. Heart score 7 points with risk of MACE 50-65%  Loaded with Brillinta and ASA. Currently on Hep gtt, statin, ASA. on telemetry monitoring. Echo pending. Cardio consulted and plan for cardiac cath later today with Dr.Avneet De. NPO for now in anticipation of cardiac cath. on IVF for now    I have personally seen and examined patient. I discussed the case with patient and PA, Ildefonso Townsend, and agree with findings and plan as noted above. 58 yo male patient with PMHx Bipolar disorder vs schizophrenia, HTN, HLD, DM, cocaine use, active smoker and essential tremor presents to ED for chest pain of one day duration. Admits to recent cocaine use one day prior to chest pain. Admitted for NSTEMI vs. vasospastic angina.   PE: mostly unremarkable, AOx3, heart normal S1/S2, Lungs CTA B/L, abdomen nontender, +BS, no edema noted on LE B/L  Labs significant for rising troponin (154->273->388), Lipid panel: /HDL 34/, hgbA1c 6.8, Na 130 (corrected for Hyperglycemia 132)  /CKMB 37.9ECG remarkable for NSR HR 64, left axis deviation, diffuse 0.5 mm ROBLES, TWI in Lead III and aVF  Currently not hypertensive, tachycardic, agitated or anxious.    #1 NSTEMI vs. vasospastic angina  RF: DM, HTN, +smoking hx, HLD. Heart score 7 points with risk of MACE 50-65%  Loaded with Brillinta and ASA. Currently on Hep gtt, statin, ASA. on telemetry monitoring. Echo pending. Cardio consulted and plan for cardiac cath later today with Dr.Avneet De. NPO for now in anticipation of cardiac cath. on IVF for now  Ativan 1mg prn chest pain, agitation.  I have personally seen and examined patient. I discussed the case with patient and PA, Ildefonso Townsend, and agree with findings and plan as noted above.

## 2022-08-05 LAB
ANION GAP SERPL CALC-SCNC: 10 MMOL/L — SIGNIFICANT CHANGE UP (ref 7–14)
APTT BLD: 27.9 SEC — SIGNIFICANT CHANGE UP (ref 27–36.3)
BUN SERPL-MCNC: 9 MG/DL — SIGNIFICANT CHANGE UP (ref 7–23)
CALCIUM SERPL-MCNC: 9 MG/DL — SIGNIFICANT CHANGE UP (ref 8.4–10.5)
CHLORIDE SERPL-SCNC: 101 MMOL/L — SIGNIFICANT CHANGE UP (ref 98–107)
CO2 SERPL-SCNC: 24 MMOL/L — SIGNIFICANT CHANGE UP (ref 22–31)
CREAT SERPL-MCNC: 0.75 MG/DL — SIGNIFICANT CHANGE UP (ref 0.5–1.3)
EGFR: 106 ML/MIN/1.73M2 — SIGNIFICANT CHANGE UP
GLUCOSE BLDC GLUCOMTR-MCNC: 188 MG/DL — HIGH (ref 70–99)
GLUCOSE BLDC GLUCOMTR-MCNC: 204 MG/DL — HIGH (ref 70–99)
GLUCOSE BLDC GLUCOMTR-MCNC: 262 MG/DL — HIGH (ref 70–99)
GLUCOSE SERPL-MCNC: 181 MG/DL — HIGH (ref 70–99)
HCT VFR BLD CALC: 41.7 % — SIGNIFICANT CHANGE UP (ref 39–50)
HGB BLD-MCNC: 14.3 G/DL — SIGNIFICANT CHANGE UP (ref 13–17)
MAGNESIUM SERPL-MCNC: 1.8 MG/DL — SIGNIFICANT CHANGE UP (ref 1.6–2.6)
MCHC RBC-ENTMCNC: 32.6 PG — SIGNIFICANT CHANGE UP (ref 27–34)
MCHC RBC-ENTMCNC: 34.3 GM/DL — SIGNIFICANT CHANGE UP (ref 32–36)
MCV RBC AUTO: 95.2 FL — SIGNIFICANT CHANGE UP (ref 80–100)
NRBC # BLD: 0 /100 WBCS — SIGNIFICANT CHANGE UP
NRBC # FLD: 0 K/UL — SIGNIFICANT CHANGE UP
PHOSPHATE SERPL-MCNC: 2.4 MG/DL — LOW (ref 2.5–4.5)
PLATELET # BLD AUTO: 175 K/UL — SIGNIFICANT CHANGE UP (ref 150–400)
POTASSIUM SERPL-MCNC: 3.9 MMOL/L — SIGNIFICANT CHANGE UP (ref 3.5–5.3)
POTASSIUM SERPL-SCNC: 3.9 MMOL/L — SIGNIFICANT CHANGE UP (ref 3.5–5.3)
RBC # BLD: 4.38 M/UL — SIGNIFICANT CHANGE UP (ref 4.2–5.8)
RBC # FLD: 12.4 % — SIGNIFICANT CHANGE UP (ref 10.3–14.5)
SODIUM SERPL-SCNC: 135 MMOL/L — SIGNIFICANT CHANGE UP (ref 135–145)
WBC # BLD: 6.53 K/UL — SIGNIFICANT CHANGE UP (ref 3.8–10.5)
WBC # FLD AUTO: 6.53 K/UL — SIGNIFICANT CHANGE UP (ref 3.8–10.5)

## 2022-08-05 RX ORDER — HEPARIN SODIUM 5000 [USP'U]/ML
5000 INJECTION INTRAVENOUS; SUBCUTANEOUS EVERY 8 HOURS
Refills: 0 | Status: DISCONTINUED | OUTPATIENT
Start: 2022-08-05 | End: 2022-08-08

## 2022-08-05 RX ORDER — INSULIN GLARGINE 100 [IU]/ML
5 INJECTION, SOLUTION SUBCUTANEOUS AT BEDTIME
Refills: 0 | Status: DISCONTINUED | OUTPATIENT
Start: 2022-08-05 | End: 2022-08-08

## 2022-08-05 RX ORDER — INSULIN LISPRO 100/ML
VIAL (ML) SUBCUTANEOUS
Refills: 0 | Status: DISCONTINUED | OUTPATIENT
Start: 2022-08-05 | End: 2022-08-08

## 2022-08-05 RX ORDER — CLOPIDOGREL BISULFATE 75 MG/1
75 TABLET, FILM COATED ORAL DAILY
Refills: 0 | Status: DISCONTINUED | OUTPATIENT
Start: 2022-08-05 | End: 2022-08-08

## 2022-08-05 RX ADMIN — Medication 81 MILLIGRAM(S): at 07:30

## 2022-08-05 RX ADMIN — Medication 650 MILLIGRAM(S): at 14:15

## 2022-08-05 RX ADMIN — Medication 650 MILLIGRAM(S): at 13:15

## 2022-08-05 RX ADMIN — Medication 7.5 MILLIGRAM(S): at 22:24

## 2022-08-05 RX ADMIN — Medication 1: at 22:04

## 2022-08-05 RX ADMIN — HEPARIN SODIUM 6000 UNIT(S): 5000 INJECTION INTRAVENOUS; SUBCUTANEOUS at 05:57

## 2022-08-05 RX ADMIN — ATORVASTATIN CALCIUM 40 MILLIGRAM(S): 80 TABLET, FILM COATED ORAL at 22:23

## 2022-08-05 RX ADMIN — OLANZAPINE 20 MILLIGRAM(S): 15 TABLET, FILM COATED ORAL at 11:17

## 2022-08-05 RX ADMIN — Medication 7.5 MILLIGRAM(S): at 11:18

## 2022-08-05 RX ADMIN — Medication 2: at 01:11

## 2022-08-05 RX ADMIN — Medication 1: at 05:11

## 2022-08-05 RX ADMIN — HEPARIN SODIUM 5000 UNIT(S): 5000 INJECTION INTRAVENOUS; SUBCUTANEOUS at 13:04

## 2022-08-05 RX ADMIN — CLOPIDOGREL BISULFATE 75 MILLIGRAM(S): 75 TABLET, FILM COATED ORAL at 13:03

## 2022-08-05 RX ADMIN — HEPARIN SODIUM 5000 UNIT(S): 5000 INJECTION INTRAVENOUS; SUBCUTANEOUS at 22:28

## 2022-08-05 RX ADMIN — HEPARIN SODIUM 1700 UNIT(S)/HR: 5000 INJECTION INTRAVENOUS; SUBCUTANEOUS at 05:05

## 2022-08-05 RX ADMIN — Medication 2: at 13:04

## 2022-08-05 RX ADMIN — INSULIN GLARGINE 5 UNIT(S): 100 INJECTION, SOLUTION SUBCUTANEOUS at 22:03

## 2022-08-05 RX ADMIN — Medication 1: at 18:03

## 2022-08-05 NOTE — PROGRESS NOTE ADULT - PROBLEM SELECTOR PLAN 2
- UTox positive fo cocaine; patient reports use on 8/1 and 8/2.  - Counseled on abstaining from further cocaine use. - UTox positive fo cocaine; patient reports use on 8/1 and 8/2.  - Counseled on abstaining from further cocaine use

## 2022-08-05 NOTE — DISCHARGE NOTE PROVIDER - NSDCMRMEDTOKEN_GEN_ALL_CORE_FT
bisacodyl 5 mg oral delayed release tablet: 2 tab(s) orally once a day, As Needed  Jardiance 25 mg oral tablet: 1 tab(s) orally once a day (in the morning)  Lipitor 40 mg oral tablet: 1 tab(s) orally once a day  metFORMIN 1000 mg oral tablet: 1 tab(s) orally 2 times a day  OLANZapine 20 mg oral tablet: 1 tab(s) orally once a day  propranolol 20 mg oral tablet: 1 tab(s) orally once a day  Tradjenta 5 mg oral tablet: 1 tab(s) orally once a day  trihexyphenidyl 5 mg oral tablet: 1.5 tab(s) orally 2 times a day  Vitamin D3 1250 mcg (50,000 intl units) oral capsule: 1 cap(s) orally once a week   acetaminophen 325 mg oral tablet: 2 tab(s) orally every 6 hours, As needed, Temp greater or equal to 38C (100.4F), Mild Pain (1 - 3)  bisacodyl 5 mg oral delayed release tablet: 2 tab(s) orally once a day, As Needed  Jardiance 25 mg oral tablet: 1 tab(s) orally once a day (in the morning)  Lipitor 40 mg oral tablet: 1 tab(s) orally once a day  metFORMIN 1000 mg oral tablet: 1 tab(s) orally 2 times a day  OLANZapine 20 mg oral tablet: 1 tab(s) orally once a day  Tradjenta 5 mg oral tablet: 1 tab(s) orally once a day  trihexyphenidyl 5 mg oral tablet: 1.5 tab(s) orally 2 times a day  Vitamin D3 1250 mcg (50,000 intl units) oral capsule: 1 cap(s) orally once a week   acetaminophen 325 mg oral tablet: 2 tab(s) orally every 6 hours, As needed, Temp greater or equal to 38C (100.4F), Mild Pain (1 - 3)  aspirin 81 mg oral delayed release tablet: 1 tab(s) orally once a day  bisacodyl 5 mg oral delayed release tablet: 2 tab(s) orally once a day, As Needed  clopidogrel 75 mg oral tablet: 1 tab(s) orally once a day  Jardiance 25 mg oral tablet: 1 tab(s) orally once a day (in the morning)  Lipitor 40 mg oral tablet: 1 tab(s) orally once a day  lisinopril 10 mg oral tablet: 1 tab(s) orally once a day   metFORMIN 1000 mg oral tablet: 1 tab(s) orally 2 times a day  OLANZapine 20 mg oral tablet: 1 tab(s) orally once a day  Tradjenta 5 mg oral tablet: 1 tab(s) orally once a day  trihexyphenidyl 5 mg oral tablet: 1.5 tab(s) orally 2 times a day  Vitamin D3 1250 mcg (50,000 intl units) oral capsule: 1 cap(s) orally once a week

## 2022-08-05 NOTE — PROGRESS NOTE ADULT - PROBLEM SELECTOR PLAN 5
- Check A1C  - ISS, FS - Well controlled with A1C of 6.8  - Diabetic diet, monitor FS's  - Will start 5U lantus qhs given borderline elevated FS's  - Adjust lantus as needed

## 2022-08-05 NOTE — DISCHARGE NOTE PROVIDER - NSDCCPCAREPLAN_GEN_ALL_CORE_FT
PRINCIPAL DISCHARGE DIAGNOSIS  Diagnosis: NSTEMI (non-ST elevation myocardial infarction)  Assessment and Plan of Treatment: you had a heart attack and had a cardiac cath with a small heart vessel blockage that cannot be intervene, please continue aspirin and plavix as directed and followup with your cardiologist or Dr Madden in 1-2 weeks  No heavy lifting greater than 5-10 pounds with right wrist x one week. No strenuous activity x 3 weeks. Monitor site of procedure and notify your doctor for any redness, swelling, discharge      SECONDARY DISCHARGE DIAGNOSES  Diagnosis: Type II diabetes mellitus  Assessment and Plan of Treatment: Goal A1C 7.0. Your A1C is 6.8  Hypoglycemia management: please check your fingerstick every morning or if you are not feeling well. If your FS is >300mg/dl X3 or more readings please contact your PMD/Endocrinologist. If your FS is low <70mg/dl and/or you have symptoms of very low blood sugar FIRST drink1/2 cup of apple juice (or take 4 glucose tab/tube of glucose gel) and recheck FS in 15mins. Repeat these steps until blood sugar is above 100mg/dl, if NECESSARY. Then call your provider to discuss low blood sugar.   What to expend at followup appointment: please bring a log of your fingerstick and/or your glucometer to you appointment. Your blood sugar tracking will help your diabetes team determine the best plan    Diagnosis: Cocaine abuse  Assessment and Plan of Treatment: recommend to stop cocaine use as it will increase artery spasm and increase change of chest pain    Diagnosis: Essential hypertension  Assessment and Plan of Treatment: Low sodium and fat diet, continue anti-hypertensive medications, and follow up with primary care physician.     PRINCIPAL DISCHARGE DIAGNOSIS  Diagnosis: NSTEMI (non-ST elevation myocardial infarction)  Assessment and Plan of Treatment: you had a heart attack and had a cardiac cath with a small heart vessel blockage that cannot be intervene, please continue aspirin and plavix as directed and followup with your cardiologist or Dr Madden in 1-2 weeks  No heavy lifting greater than 5-10 pounds with right wrist x one week. No strenuous activity x 3 weeks. No baths/pools for one week after catheterization (OK to Shower)Monitor site of procedure and notify your doctor for any redness, swelling, discharge      SECONDARY DISCHARGE DIAGNOSES  Diagnosis: Essential hypertension  Assessment and Plan of Treatment: Continue blood pressure medication regimen as directed. Monitor for any visual changes, headaches or dizziness.  Monitor blood pressure regularly.  Follow up with your PCP for further management for high blood pressure.    Diagnosis: Type II diabetes mellitus  Assessment and Plan of Treatment: Goal A1C 7.0. Your A1C is 6.8  Hypoglycemia management: please check your fingerstick every morning or if you are not feeling well. If your FS is >300mg/dl X3 or more readings please contact your PMD/Endocrinologist. If your FS is low <70mg/dl and/or you have symptoms of very low blood sugar FIRST drink1/2 cup of apple juice (or take 4 glucose tab/tube of glucose gel) and recheck FS in 15mins. Repeat these steps until blood sugar is above 100mg/dl, if NECESSARY. Then call your provider to discuss low blood sugar.   What to expend at followup appointment: please bring a log of your fingerstick and/or your glucometer to you appointment. Your blood sugar tracking will help your diabetes team determine the best plan    Diagnosis: Cocaine abuse  Assessment and Plan of Treatment: recommend to stop cocaine use as it will increase artery spasm and increase change of chest pain. Please hold off on resuming your propanolol until cleared by your outpatient cardiologist.    Diagnosis: Hyperlipidemia  Assessment and Plan of Treatment: Continue prescribed medications to control your cholesterol levels and a DASH (Low fat/salt) diet. Follow up with your primary care provider upon discharge for further management and monitoring of cholesterol levels.

## 2022-08-05 NOTE — PATIENT PROFILE ADULT - FALL HARM RISK - HARM RISK INTERVENTIONS

## 2022-08-05 NOTE — CHART NOTE - NSCHARTNOTEFT_GEN_A_CORE
pt seen s/p right radial access cath. site C/D/I. No hematoma, no bruits.   will continue to monitor pt seen s/p right radial access cath. site C/D/I. No hematoma, no bruits.   will continue to monitor and recheck in AM  DC heparin and discharge with asa/plavix per cards

## 2022-08-05 NOTE — DISCHARGE NOTE PROVIDER - HOSPITAL COURSE
57 y.o male pmh of bipolar disorder vs schizophrenia, HTN, HLD, DM, cocaine use, active smoker and essential tremor presents with chest pain admitted for NSTEMI vs vasospastic angina.       Hospital course:    Pt admitted with CP, CE elevated. EKG with unchanged from baseline. CP in setting of cocaine use but given trop elevation likely NSTEMI. Cardiology consulted. Pt  ASA and brilinta loaded, started on hep gtt. S/p cath with dLAD disease recommended medical management. Hold BB given cocaine use history. TTE *************        Case discussed with  ******, pt medically optimized for discharge to [facility]. Reviewed discharge medications with patient; All new medications requiring new prescription sent to pharmacy of patients choice. Reviewed need for prescription for previous home medication and new prescriptions sent if requested. Patient in agreement and understands.   57 y.o male pmh of bipolar disorder vs schizophrenia, HTN, HLD, DM, cocaine use, active smoker and essential tremor presents with chest pain admitted for NSTEMI vs vasospastic angina.       Hospital course:    Pt admitted with CP, CE elevated. EKG with unchanged from baseline. CP in setting of cocaine use but given trop elevation likely NSTEMI. Cardiology consulted. Pt  ASA and brilinta loaded, started on hep gtt. S/p cath with dLAD disease recommended medical management. Hold BB given cocaine use history. TTE EF 64 concentric left ventricular remodeling.        Case discussed with  ******, pt medically optimized for discharge to [facility]. Reviewed discharge medications with patient; All new medications requiring new prescription sent to pharmacy of patients choice. Reviewed need for prescription for previous home medication and new prescriptions sent if requested. Patient in agreement and understands.   57 y.o male pmh of bipolar disorder vs schizophrenia, HTN, HLD, DM, cocaine use, active smoker and essential tremor presents with chest pain admitted for NSTEMI vs vasospastic angina.       Hospital course:    Pt admitted with CP, CE elevated. EKG with unchanged from baseline. CP in setting of cocaine use but given trop elevation likely NSTEMI. Cardiology consulted. Pt  ASA and brilinta loaded, started on hep gtt. S/p cath with dLAD disease recommended medical management. Hold BB given cocaine use history. TTE EF 64 concentric left ventricular remodeling.      - Utox + for cocaine  - Differential includes ACS vs vasospastic angina  - S/P brilinta load, ASA load, and on heparin gtt, s/p LHC done today that showed-  Small caliber distal LAD with diffuse disease , no change with NTG. This could indicate SCAD or diffuse atherosclerotic coronary artery disease.   - Apprec Cards recs, c/w medical therapy (ASA/plavix)  - Avoid beta-blocker given cocaine abuse; patient counseled on abstaining from cocaine use  - C/w lipitor  - TTE shows EF 64%, LVH, o/w non-revealing, no WMA.    Case discussed with Dr. Ta, pt medically optimized for discharge to [facility]. Reviewed discharge medications with patient; All new medications requiring new prescription sent to pharmacy of patients choice. Reviewed need for prescription for previous home medication and new prescriptions sent if requested. Patient in agreement and understands.   57 y.o male pmh of bipolar disorder vs schizophrenia, HTN, HLD, DM, cocaine use, active smoker and essential tremor presents with chest pain admitted for NSTEMI vs vasospastic angina.       Hospital course:    Pt admitted with CP, CE elevated. EKG with unchanged from baseline. CP in setting of cocaine use but given trop elevation likely NSTEMI. Cardiology consulted. Pt  ASA and brilinta loaded, started on hep gtt. S/p cath with dLAD disease recommended medical management. Hold BB given cocaine use history.TTE shows EF 64%, LVH, o/w non-revealing, no WMA. DC on ASA/Plavix with home meds + ACEi.     Case discussed with Dr. Ta, pt medically optimized for discharge to [facility]. Reviewed discharge medications with patient; All new medications requiring new prescription sent to pharmacy of patients choice. Reviewed need for prescription for previous home medication and new prescriptions sent if requested. Patient in agreement and understands.

## 2022-08-05 NOTE — PROGRESS NOTE ADULT - SUBJECTIVE AND OBJECTIVE BOX
Patient is a 57y old  Male who presents with a chief complaint of Chest pain (04 Aug 2022 08:12)      INTERVAL HPI/OVERNIGHT EVENTS:    Review of Systems: 12 point review of systems otherwise negative    MEDICATIONS  (STANDING):  aspirin enteric coated 81 milliGRAM(s) Oral daily  atorvastatin 40 milliGRAM(s) Oral at bedtime  dextrose 5%. 1000 milliLiter(s) (50 mL/Hr) IV Continuous <Continuous>  dextrose 5%. 1000 milliLiter(s) (100 mL/Hr) IV Continuous <Continuous>  dextrose 50% Injectable 25 Gram(s) IV Push once  dextrose 50% Injectable 12.5 Gram(s) IV Push once  dextrose 50% Injectable 25 Gram(s) IV Push once  glucagon  Injectable 1 milliGRAM(s) IntraMuscular once  insulin lispro (ADMELOG) corrective regimen sliding scale   SubCutaneous at bedtime  insulin lispro (ADMELOG) corrective regimen sliding scale   SubCutaneous three times a day before meals  OLANZapine 20 milliGRAM(s) Oral daily  trihexyphenidyl 7.5 milliGRAM(s) Oral two times a day    MEDICATIONS  (PRN):  acetaminophen     Tablet .. 650 milliGRAM(s) Oral every 6 hours PRN Temp greater or equal to 38C (100.4F), Mild Pain (1 - 3)  bisacodyl 10 milliGRAM(s) Oral at bedtime PRN Constipation  dextrose Oral Gel 15 Gram(s) Oral once PRN Blood Glucose LESS THAN 70 milliGRAM(s)/deciliter  LORazepam   Injectable 1 milliGRAM(s) IV Push every 10 minutes PRN Anxiety, chest pain  melatonin 3 milliGRAM(s) Oral at bedtime PRN Insomnia      Allergies    No Known Allergies    Intolerances          Vital Signs Last 24 Hrs  T(C): 36.7 (05 Aug 2022 04:15), Max: 37.2 (04 Aug 2022 20:15)  T(F): 98.1 (05 Aug 2022 04:15), Max: 98.9 (04 Aug 2022 20:15)  HR: 81 (05 Aug 2022 04:15) (63 - 81)  BP: 149/91 (05 Aug 2022 04:15) (121/93 - 149/91)  BP(mean): --  RR: 17 (05 Aug 2022 04:15) (17 - 17)  SpO2: 95% (05 Aug 2022 04:15) (95% - 98%)    Parameters below as of 05 Aug 2022 04:15  Patient On (Oxygen Delivery Method): room air      CAPILLARY BLOOD GLUCOSE      POCT Blood Glucose.: 221 mg/dL (05 Aug 2022 09:32)  POCT Blood Glucose.: 156 mg/dL (05 Aug 2022 05:10)  POCT Blood Glucose.: 227 mg/dL (05 Aug 2022 00:36)  POCT Blood Glucose.: 228 mg/dL (04 Aug 2022 20:03)  POCT Blood Glucose.: 130 mg/dL (04 Aug 2022 17:17)        Physical Exam:    Daily     Daily   General:  Well appearing, NAD, not cachetic  HEENT:  Nonicteric, PERRLA  CV:  RRR, no murmur, no JVD  Lungs:  CTA B/L, no wheezes, rales, rhonchi  Abdomen:  Soft, non-tender, no distended, positive BS, no hepatosplenomegaly  Extremities:  2+ pulses, no c/c, no edema  Skin:  Warm and dry, no rashes  :  No mercado  Neuro:  AAOx3, non-focal, CN II-XII grossly intact  No Restraints    LABS:                        14.3   6.53  )-----------( 175      ( 05 Aug 2022 04:17 )             41.7     08-05    135  |  101  |  9   ----------------------------<  181<H>  3.9   |  24  |  0.75    Ca    9.0      05 Aug 2022 04:17  Phos  2.4     08-05  Mg     1.80     08-05    TPro  7.2  /  Alb  4.2  /  TBili  0.3  /  DBili  x   /  AST  40  /  ALT  22  /  AlkPhos  59  08-04    PT/INR - ( 04 Aug 2022 04:30 )   PT: 10.9 sec;   INR: 0.94 ratio         PTT - ( 05 Aug 2022 04:17 )  PTT:27.9 sec        RADIOLOGY & ADDITIONAL TESTS:  Reviewed by me       Patient is a 57y old  Male who presents with a chief complaint of Chest pain (04 Aug 2022 08:12)      INTERVAL HPI/OVERNIGHT EVENTS:  Seen this morning after LHC was done, feels ok but with some chest pain. +Shoulder pain (chronic). No other complaints.    Review of Systems: 12 point review of systems otherwise negative    MEDICATIONS  (STANDING):  aspirin enteric coated 81 milliGRAM(s) Oral daily  atorvastatin 40 milliGRAM(s) Oral at bedtime  dextrose 5%. 1000 milliLiter(s) (50 mL/Hr) IV Continuous <Continuous>  dextrose 5%. 1000 milliLiter(s) (100 mL/Hr) IV Continuous <Continuous>  dextrose 50% Injectable 25 Gram(s) IV Push once  dextrose 50% Injectable 12.5 Gram(s) IV Push once  dextrose 50% Injectable 25 Gram(s) IV Push once  glucagon  Injectable 1 milliGRAM(s) IntraMuscular once  insulin lispro (ADMELOG) corrective regimen sliding scale   SubCutaneous at bedtime  insulin lispro (ADMELOG) corrective regimen sliding scale   SubCutaneous three times a day before meals  OLANZapine 20 milliGRAM(s) Oral daily  trihexyphenidyl 7.5 milliGRAM(s) Oral two times a day    MEDICATIONS  (PRN):  acetaminophen     Tablet .. 650 milliGRAM(s) Oral every 6 hours PRN Temp greater or equal to 38C (100.4F), Mild Pain (1 - 3)  bisacodyl 10 milliGRAM(s) Oral at bedtime PRN Constipation  dextrose Oral Gel 15 Gram(s) Oral once PRN Blood Glucose LESS THAN 70 milliGRAM(s)/deciliter  LORazepam   Injectable 1 milliGRAM(s) IV Push every 10 minutes PRN Anxiety, chest pain  melatonin 3 milliGRAM(s) Oral at bedtime PRN Insomnia      Allergies    No Known Allergies    Intolerances          Vital Signs Last 24 Hrs  T(C): 36.7 (05 Aug 2022 04:15), Max: 37.2 (04 Aug 2022 20:15)  T(F): 98.1 (05 Aug 2022 04:15), Max: 98.9 (04 Aug 2022 20:15)  HR: 81 (05 Aug 2022 04:15) (63 - 81)  BP: 149/91 (05 Aug 2022 04:15) (121/93 - 149/91)  BP(mean): --  RR: 17 (05 Aug 2022 04:15) (17 - 17)  SpO2: 95% (05 Aug 2022 04:15) (95% - 98%)    Parameters below as of 05 Aug 2022 04:15  Patient On (Oxygen Delivery Method): room air      CAPILLARY BLOOD GLUCOSE      POCT Blood Glucose.: 221 mg/dL (05 Aug 2022 09:32)  POCT Blood Glucose.: 156 mg/dL (05 Aug 2022 05:10)  POCT Blood Glucose.: 227 mg/dL (05 Aug 2022 00:36)  POCT Blood Glucose.: 228 mg/dL (04 Aug 2022 20:03)  POCT Blood Glucose.: 130 mg/dL (04 Aug 2022 17:17)        Physical Exam:    Daily     Daily   General:  Well appearing, NAD, not cachetic  HEENT:  Nonicteric, PERRLA  CV:  RRR, no murmur, no JVD  Lungs:  CTA B/L, no wheezes, rales, rhonchi  Abdomen:  Soft, non-tender, no distended, positive BS, no hepatosplenomegaly  Extremities:  2+ pulses, no c/c, no edema  Skin:  Warm and dry, no rashes  :  No mercado  Neuro:  AAOx3, non-focal, CN II-XII grossly intact  No Restraints    LABS:                        14.3   6.53  )-----------( 175      ( 05 Aug 2022 04:17 )             41.7     08-05    135  |  101  |  9   ----------------------------<  181<H>  3.9   |  24  |  0.75    Ca    9.0      05 Aug 2022 04:17  Phos  2.4     08-05  Mg     1.80     08-05    TPro  7.2  /  Alb  4.2  /  TBili  0.3  /  DBili  x   /  AST  40  /  ALT  22  /  AlkPhos  59  08-04    PT/INR - ( 04 Aug 2022 04:30 )   PT: 10.9 sec;   INR: 0.94 ratio         PTT - ( 05 Aug 2022 04:17 )  PTT:27.9 sec        RADIOLOGY & ADDITIONAL TESTS:  Reviewed by me

## 2022-08-05 NOTE — PROGRESS NOTE ADULT - PROBLEM SELECTOR PLAN 6
- On heparin gtt no further DVT prevention required. Heparin SQ started after C      D/w ACP  Patient can't be discharged over weekend as there is no LPN available where he resides per SW notes ([facility] [place] [address])  Anticipate dc home on Monday (needs ambulette)

## 2022-08-05 NOTE — DISCHARGE NOTE PROVIDER - NSDCACTIVITY_GEN_ALL_CORE
ambulate as tolerates ambulate as tolerates/No heavy lifting/straining/Follow Instructions Provided by your Surgical Team

## 2022-08-05 NOTE — DISCHARGE NOTE PROVIDER - NSDCFUADDAPPT_GEN_ALL_CORE_FT
Please follow up with your primary care provider in 1-2 weeks following discharge from the hospital for continued monitoring and management.     Please follow up with your cardiologist in 1-2 weeks following discharge from the hopsital for continued monitoring and management

## 2022-08-06 LAB
ANION GAP SERPL CALC-SCNC: 11 MMOL/L — SIGNIFICANT CHANGE UP (ref 7–14)
APTT BLD: 27.6 SEC — SIGNIFICANT CHANGE UP (ref 27–36.3)
BUN SERPL-MCNC: 13 MG/DL — SIGNIFICANT CHANGE UP (ref 7–23)
CALCIUM SERPL-MCNC: 9.1 MG/DL — SIGNIFICANT CHANGE UP (ref 8.4–10.5)
CHLORIDE SERPL-SCNC: 99 MMOL/L — SIGNIFICANT CHANGE UP (ref 98–107)
CO2 SERPL-SCNC: 23 MMOL/L — SIGNIFICANT CHANGE UP (ref 22–31)
CREAT SERPL-MCNC: 0.86 MG/DL — SIGNIFICANT CHANGE UP (ref 0.5–1.3)
EGFR: 102 ML/MIN/1.73M2 — SIGNIFICANT CHANGE UP
GLUCOSE BLDC GLUCOMTR-MCNC: 146 MG/DL — HIGH (ref 70–99)
GLUCOSE BLDC GLUCOMTR-MCNC: 180 MG/DL — HIGH (ref 70–99)
GLUCOSE BLDC GLUCOMTR-MCNC: 202 MG/DL — HIGH (ref 70–99)
GLUCOSE BLDC GLUCOMTR-MCNC: 207 MG/DL — HIGH (ref 70–99)
GLUCOSE SERPL-MCNC: 191 MG/DL — HIGH (ref 70–99)
HCT VFR BLD CALC: 44.9 % — SIGNIFICANT CHANGE UP (ref 39–50)
HGB BLD-MCNC: 14.6 G/DL — SIGNIFICANT CHANGE UP (ref 13–17)
MAGNESIUM SERPL-MCNC: 1.9 MG/DL — SIGNIFICANT CHANGE UP (ref 1.6–2.6)
MCHC RBC-ENTMCNC: 31.6 PG — SIGNIFICANT CHANGE UP (ref 27–34)
MCHC RBC-ENTMCNC: 32.5 GM/DL — SIGNIFICANT CHANGE UP (ref 32–36)
MCV RBC AUTO: 97.2 FL — SIGNIFICANT CHANGE UP (ref 80–100)
NRBC # BLD: 0 /100 WBCS — SIGNIFICANT CHANGE UP
NRBC # FLD: 0 K/UL — SIGNIFICANT CHANGE UP
PHOSPHATE SERPL-MCNC: 2.7 MG/DL — SIGNIFICANT CHANGE UP (ref 2.5–4.5)
PLATELET # BLD AUTO: 165 K/UL — SIGNIFICANT CHANGE UP (ref 150–400)
POTASSIUM SERPL-MCNC: 4.2 MMOL/L — SIGNIFICANT CHANGE UP (ref 3.5–5.3)
POTASSIUM SERPL-SCNC: 4.2 MMOL/L — SIGNIFICANT CHANGE UP (ref 3.5–5.3)
RBC # BLD: 4.62 M/UL — SIGNIFICANT CHANGE UP (ref 4.2–5.8)
RBC # FLD: 12.3 % — SIGNIFICANT CHANGE UP (ref 10.3–14.5)
SODIUM SERPL-SCNC: 133 MMOL/L — LOW (ref 135–145)
WBC # BLD: 6.01 K/UL — SIGNIFICANT CHANGE UP (ref 3.8–10.5)
WBC # FLD AUTO: 6.01 K/UL — SIGNIFICANT CHANGE UP (ref 3.8–10.5)

## 2022-08-06 RX ADMIN — Medication 1: at 09:00

## 2022-08-06 RX ADMIN — HEPARIN SODIUM 5000 UNIT(S): 5000 INJECTION INTRAVENOUS; SUBCUTANEOUS at 13:09

## 2022-08-06 RX ADMIN — Medication 7.5 MILLIGRAM(S): at 21:32

## 2022-08-06 RX ADMIN — Medication 2: at 13:07

## 2022-08-06 RX ADMIN — OLANZAPINE 20 MILLIGRAM(S): 15 TABLET, FILM COATED ORAL at 11:36

## 2022-08-06 RX ADMIN — Medication 7.5 MILLIGRAM(S): at 11:36

## 2022-08-06 RX ADMIN — INSULIN GLARGINE 5 UNIT(S): 100 INJECTION, SOLUTION SUBCUTANEOUS at 21:50

## 2022-08-06 RX ADMIN — Medication 81 MILLIGRAM(S): at 11:36

## 2022-08-06 RX ADMIN — CLOPIDOGREL BISULFATE 75 MILLIGRAM(S): 75 TABLET, FILM COATED ORAL at 11:36

## 2022-08-06 RX ADMIN — ATORVASTATIN CALCIUM 40 MILLIGRAM(S): 80 TABLET, FILM COATED ORAL at 21:32

## 2022-08-06 RX ADMIN — HEPARIN SODIUM 5000 UNIT(S): 5000 INJECTION INTRAVENOUS; SUBCUTANEOUS at 05:28

## 2022-08-06 RX ADMIN — HEPARIN SODIUM 5000 UNIT(S): 5000 INJECTION INTRAVENOUS; SUBCUTANEOUS at 21:40

## 2022-08-06 NOTE — PROGRESS NOTE ADULT - PROBLEM SELECTOR PLAN 5
- Well controlled with A1C of 6.8  - Diabetic diet, monitor FS's  - Will start 5U lantus qhs given borderline elevated FS's  - Adjust lantus as needed

## 2022-08-06 NOTE — PROVIDER CONTACT NOTE (OTHER) - ACTION/TREATMENT ORDERED:
As per PA, continue to monitor pt. at this time.
AS per provider Ildefonso (#[id number]), no further interventions needed at this time. RN will continue to monitor.

## 2022-08-06 NOTE — PROVIDER CONTACT NOTE (OTHER) - SITUATION
Patient is refusing his fingerstick at this time
Patient noted lethargic during shift, intermittent nonproductive cough, warm to touch

## 2022-08-06 NOTE — PROVIDER CONTACT NOTE (OTHER) - REASON
Patient noted lethargic during shift, intermittent nonproductive cough, warm to touch
Patient is refusing his fingerstick at this time

## 2022-08-06 NOTE — PROGRESS NOTE ADULT - SUBJECTIVE AND OBJECTIVE BOX
[facility] Division of Hospital Medicine  Vickey Belcher MD  Pager (M-F, 8A-5P): [phone]      SUBJECTIVE / OVERNIGHT EVENTS: Patient was somewhat lethargic in the morning noted by nurse. Temp 99.1. Patient ok when seen by me. Denied HA/CP/N/V/D/C  ADDITIONAL REVIEW OF SYSTEMS:    MEDICATIONS  (STANDING):  aspirin enteric coated 81 milliGRAM(s) Oral daily  atorvastatin 40 milliGRAM(s) Oral at bedtime  clopidogrel Tablet 75 milliGRAM(s) Oral daily  dextrose 5%. 1000 milliLiter(s) (50 mL/Hr) IV Continuous <Continuous>  dextrose 5%. 1000 milliLiter(s) (100 mL/Hr) IV Continuous <Continuous>  dextrose 50% Injectable 25 Gram(s) IV Push once  dextrose 50% Injectable 12.5 Gram(s) IV Push once  dextrose 50% Injectable 25 Gram(s) IV Push once  glucagon  Injectable 1 milliGRAM(s) IntraMuscular once  heparin   Injectable 5000 Unit(s) SubCutaneous every 8 hours  insulin glargine Injectable (LANTUS) 5 Unit(s) SubCutaneous at bedtime  insulin lispro (ADMELOG) corrective regimen sliding scale   SubCutaneous at bedtime  insulin lispro (ADMELOG) corrective regimen sliding scale   SubCutaneous three times a day before meals  OLANZapine 20 milliGRAM(s) Oral daily  trihexyphenidyl 7.5 milliGRAM(s) Oral two times a day    MEDICATIONS  (PRN):  acetaminophen     Tablet .. 650 milliGRAM(s) Oral every 6 hours PRN Temp greater or equal to 38C (100.4F), Mild Pain (1 - 3)  bisacodyl 10 milliGRAM(s) Oral at bedtime PRN Constipation  dextrose Oral Gel 15 Gram(s) Oral once PRN Blood Glucose LESS THAN 70 milliGRAM(s)/deciliter  LORazepam   Injectable 1 milliGRAM(s) IV Push every 10 minutes PRN Anxiety, chest pain  melatonin 3 milliGRAM(s) Oral at bedtime PRN Insomnia      I&O's Summary      PHYSICAL EXAM:  Vital Signs Last 24 Hrs  T(C): 37.2 (06 Aug 2022 11:33), Max: 37.3 (06 Aug 2022 06:20)  T(F): 99 (06 Aug 2022 11:33), Max: 99.1 (06 Aug 2022 06:20)  HR: 97 (06 Aug 2022 11:33) (84 - 97)  BP: 133/81 (06 Aug 2022 11:33) (127/69 - 138/87)  BP(mean): --  RR: 18 (06 Aug 2022 11:33) (18 - 18)  SpO2: 98% (06 Aug 2022 11:33) (95% - 98%)    Parameters below as of 06 Aug 2022 11:33  Patient On (Oxygen Delivery Method): room air      CONSTITUTIONAL: NAD, well-developed, well-groomed  EYES: PERRLA; conjunctiva and sclera clear  ENMT: Moist oral mucosa, no pharyngeal injection or exudates; normal dentition  NECK: Supple, no palpable masses; no thyromegaly  RESPIRATORY: Normal respiratory effort; lungs are clear to auscultation bilaterally  CARDIOVASCULAR: Regular rate and rhythm, normal S1 and S2, no murmur/rub/gallop; No lower extremity edema; Peripheral pulses are 2+ bilaterally  ABDOMEN: Nontender to palpation, normoactive bowel sounds, no rebound/guarding; No hepatosplenomegaly  MUSCULOSKELETAL:  Normal gait; no clubbing or cyanosis of digits; no joint swelling or tenderness to palpation  PSYCH: A+O to person, place, and time; affect appropriate  NEUROLOGY: CN 2-12 are intact and symmetric; no gross sensory deficits   SKIN: No rashes; no palpable lesions    LABS:                        14.6   6.01  )-----------( 165      ( 06 Aug 2022 05:30 )             44.9     08-06    133<L>  |  99  |  13  ----------------------------<  191<H>  4.2   |  23  |  0.86    Ca    9.1      06 Aug 2022 05:30  Phos  2.7     08-06  Mg     1.90     08-06      PTT - ( 06 Aug 2022 05:30 )  PTT:27.6 sec            RADIOLOGY & ADDITIONAL TESTS:  Results Reviewed:   Imaging Personally Reviewed:  Electrocardiogram Personally Reviewed:    COORDINATION OF CARE:  Care Discussed with Consultants/Other Providers [Y/N]:  Prior or Outpatient Records Reviewed [Y/N]:

## 2022-08-06 NOTE — PROGRESS NOTE ADULT - PROBLEM SELECTOR PLAN 2
- UTox positive fo cocaine; patient reports use on 8/1 and 8/2.  - Counseled on abstaining from further cocaine use

## 2022-08-06 NOTE — PROVIDER CONTACT NOTE (OTHER) - ASSESSMENT
Patient is A&Ox4, Patient denies pain, chest pain, shortness of breath, nausea, vomiting or dizziness.
Patient is noted to be lethargic during shift, sleeping for majority of the time. During AM med pass, patient was noted with a nonproductive cough and noted warm to touch. Rectal temp: 99.1 F.

## 2022-08-06 NOTE — PROVIDER CONTACT NOTE (OTHER) - RECOMMENDATIONS
AS per provider Ildefonso (#[id number]), no further interventions needed at this time. RN will continue to monitor.
As per PA, continue to monitor pt. at this time.

## 2022-08-06 NOTE — PROGRESS NOTE ADULT - PROBLEM SELECTOR PLAN 1
- Atypical chest pain in the setting of cocaine use for one day  - Initial troponin 154 and EKG grossly unchanged from baseline  - Utox + for cocaine  - Differential includes ACS vs vasospastic angina  - S/P brilinta load, ASA load, and on heparin gtt, s/p LHC done today that showed-  Small caliber distal LAD with diffuse disease , no change with NTG. This could indicate SCAD or diffuse atherosclerotic coronary artery disease.   - Apprec Cards recs, c/w medical therapy (ASA/plavix)  - Avoid beta-blocker given cocaine abuse; patient counseled on abstaining from cocaine use  - C/w lipitor  - TTE shows EF 64%, LVH, o/w non-revealing, no WMA

## 2022-08-06 NOTE — PROVIDER CONTACT NOTE (OTHER) - BACKGROUND
Admitting dx: STEMI. PMH: bipolar disorder, schizo, DM, HTN, HLD. Cardiac cath procedure done today.
57 year old male admitted for chest pain

## 2022-08-06 NOTE — PROGRESS NOTE ADULT - PROBLEM SELECTOR PLAN 6
Heparin SQ started after C      D/w ACP  Patient can't be discharged over weekend as there is no LPN available where he resides per SW notes ([facility] [place] [address])  Anticipate dc home on Monday (needs ambulette)

## 2022-08-07 LAB
ANION GAP SERPL CALC-SCNC: 11 MMOL/L — SIGNIFICANT CHANGE UP (ref 7–14)
APTT BLD: 27.9 SEC — SIGNIFICANT CHANGE UP (ref 27–36.3)
BUN SERPL-MCNC: 12 MG/DL — SIGNIFICANT CHANGE UP (ref 7–23)
CALCIUM SERPL-MCNC: 9.4 MG/DL — SIGNIFICANT CHANGE UP (ref 8.4–10.5)
CHLORIDE SERPL-SCNC: 101 MMOL/L — SIGNIFICANT CHANGE UP (ref 98–107)
CO2 SERPL-SCNC: 23 MMOL/L — SIGNIFICANT CHANGE UP (ref 22–31)
CREAT SERPL-MCNC: 0.88 MG/DL — SIGNIFICANT CHANGE UP (ref 0.5–1.3)
EGFR: 101 ML/MIN/1.73M2 — SIGNIFICANT CHANGE UP
GLUCOSE BLDC GLUCOMTR-MCNC: 140 MG/DL — HIGH (ref 70–99)
GLUCOSE BLDC GLUCOMTR-MCNC: 165 MG/DL — HIGH (ref 70–99)
GLUCOSE BLDC GLUCOMTR-MCNC: 171 MG/DL — HIGH (ref 70–99)
GLUCOSE BLDC GLUCOMTR-MCNC: 181 MG/DL — HIGH (ref 70–99)
GLUCOSE SERPL-MCNC: 139 MG/DL — HIGH (ref 70–99)
HCT VFR BLD CALC: 40.2 % — SIGNIFICANT CHANGE UP (ref 39–50)
HGB BLD-MCNC: 13.6 G/DL — SIGNIFICANT CHANGE UP (ref 13–17)
MAGNESIUM SERPL-MCNC: 1.8 MG/DL — SIGNIFICANT CHANGE UP (ref 1.6–2.6)
MCHC RBC-ENTMCNC: 32.5 PG — SIGNIFICANT CHANGE UP (ref 27–34)
MCHC RBC-ENTMCNC: 33.8 GM/DL — SIGNIFICANT CHANGE UP (ref 32–36)
MCV RBC AUTO: 95.9 FL — SIGNIFICANT CHANGE UP (ref 80–100)
NRBC # BLD: 0 /100 WBCS — SIGNIFICANT CHANGE UP
NRBC # FLD: 0 K/UL — SIGNIFICANT CHANGE UP
PHOSPHATE SERPL-MCNC: 3.3 MG/DL — SIGNIFICANT CHANGE UP (ref 2.5–4.5)
PLATELET # BLD AUTO: 166 K/UL — SIGNIFICANT CHANGE UP (ref 150–400)
POTASSIUM SERPL-MCNC: 4.1 MMOL/L — SIGNIFICANT CHANGE UP (ref 3.5–5.3)
POTASSIUM SERPL-SCNC: 4.1 MMOL/L — SIGNIFICANT CHANGE UP (ref 3.5–5.3)
RBC # BLD: 4.19 M/UL — LOW (ref 4.2–5.8)
RBC # FLD: 12.4 % — SIGNIFICANT CHANGE UP (ref 10.3–14.5)
SARS-COV-2 RNA SPEC QL NAA+PROBE: SIGNIFICANT CHANGE UP
SODIUM SERPL-SCNC: 135 MMOL/L — SIGNIFICANT CHANGE UP (ref 135–145)
WBC # BLD: 5.99 K/UL — SIGNIFICANT CHANGE UP (ref 3.8–10.5)
WBC # FLD AUTO: 5.99 K/UL — SIGNIFICANT CHANGE UP (ref 3.8–10.5)

## 2022-08-07 RX ADMIN — Medication 1: at 18:31

## 2022-08-07 RX ADMIN — ATORVASTATIN CALCIUM 40 MILLIGRAM(S): 80 TABLET, FILM COATED ORAL at 22:42

## 2022-08-07 RX ADMIN — CLOPIDOGREL BISULFATE 75 MILLIGRAM(S): 75 TABLET, FILM COATED ORAL at 11:40

## 2022-08-07 RX ADMIN — HEPARIN SODIUM 5000 UNIT(S): 5000 INJECTION INTRAVENOUS; SUBCUTANEOUS at 16:08

## 2022-08-07 RX ADMIN — HEPARIN SODIUM 5000 UNIT(S): 5000 INJECTION INTRAVENOUS; SUBCUTANEOUS at 22:42

## 2022-08-07 RX ADMIN — OLANZAPINE 20 MILLIGRAM(S): 15 TABLET, FILM COATED ORAL at 11:39

## 2022-08-07 RX ADMIN — INSULIN GLARGINE 5 UNIT(S): 100 INJECTION, SOLUTION SUBCUTANEOUS at 23:00

## 2022-08-07 RX ADMIN — Medication 81 MILLIGRAM(S): at 11:39

## 2022-08-07 RX ADMIN — Medication 1: at 12:42

## 2022-08-07 RX ADMIN — HEPARIN SODIUM 5000 UNIT(S): 5000 INJECTION INTRAVENOUS; SUBCUTANEOUS at 05:52

## 2022-08-07 RX ADMIN — Medication 7.5 MILLIGRAM(S): at 22:42

## 2022-08-07 RX ADMIN — Medication 7.5 MILLIGRAM(S): at 11:38

## 2022-08-07 NOTE — PROGRESS NOTE ADULT - SUBJECTIVE AND OBJECTIVE BOX
[facility] Division of Hospital Medicine  Vickey Belcher MD  Pager (M-F, 8A-5P): [phone]      SUBJECTIVE / OVERNIGHT EVENTS: NAEON. Glucose remains elevated in 200s. Will increase insulin dose. Patient denies fevers/chills/headache/N/V/D/C  ADDITIONAL REVIEW OF SYSTEMS:    MEDICATIONS  (STANDING):  aspirin enteric coated 81 milliGRAM(s) Oral daily  atorvastatin 40 milliGRAM(s) Oral at bedtime  clopidogrel Tablet 75 milliGRAM(s) Oral daily  dextrose 5%. 1000 milliLiter(s) (50 mL/Hr) IV Continuous <Continuous>  dextrose 5%. 1000 milliLiter(s) (100 mL/Hr) IV Continuous <Continuous>  dextrose 50% Injectable 25 Gram(s) IV Push once  dextrose 50% Injectable 12.5 Gram(s) IV Push once  dextrose 50% Injectable 25 Gram(s) IV Push once  glucagon  Injectable 1 milliGRAM(s) IntraMuscular once  heparin   Injectable 5000 Unit(s) SubCutaneous every 8 hours  insulin glargine Injectable (LANTUS) 5 Unit(s) SubCutaneous at bedtime  insulin lispro (ADMELOG) corrective regimen sliding scale   SubCutaneous at bedtime  insulin lispro (ADMELOG) corrective regimen sliding scale   SubCutaneous three times a day before meals  OLANZapine 20 milliGRAM(s) Oral daily  trihexyphenidyl 7.5 milliGRAM(s) Oral two times a day    MEDICATIONS  (PRN):  acetaminophen     Tablet .. 650 milliGRAM(s) Oral every 6 hours PRN Temp greater or equal to 38C (100.4F), Mild Pain (1 - 3)  bisacodyl 10 milliGRAM(s) Oral at bedtime PRN Constipation  dextrose Oral Gel 15 Gram(s) Oral once PRN Blood Glucose LESS THAN 70 milliGRAM(s)/deciliter  LORazepam   Injectable 1 milliGRAM(s) IV Push every 10 minutes PRN Anxiety, chest pain  melatonin 3 milliGRAM(s) Oral at bedtime PRN Insomnia      I&O's Summary      PHYSICAL EXAM:  Vital Signs Last 24 Hrs  T(C): 37 (07 Aug 2022 11:46), Max: 37.1 (06 Aug 2022 17:00)  T(F): 98.6 (07 Aug 2022 11:46), Max: 98.8 (06 Aug 2022 17:00)  HR: 86 (07 Aug 2022 11:46) (86 - 98)  BP: 111/75 (07 Aug 2022 11:46) (104/63 - 128/78)  BP(mean): --  RR: 18 (07 Aug 2022 11:46) (18 - 18)  SpO2: 96% (07 Aug 2022 11:46) (95% - 99%)    Parameters below as of 07 Aug 2022 11:46  Patient On (Oxygen Delivery Method): room air      CONSTITUTIONAL: NAD, well-developed, well-groomed  EYES: PERRLA; conjunctiva and sclera clear  ENMT: Moist oral mucosa, no pharyngeal injection or exudates; normal dentition  NECK: Supple, no palpable masses; no thyromegaly  RESPIRATORY: Normal respiratory effort; lungs are clear to auscultation bilaterally  CARDIOVASCULAR: Regular rate and rhythm, normal S1 and S2, no murmur/rub/gallop; No lower extremity edema; Peripheral pulses are 2+ bilaterally  ABDOMEN: Nontender to palpation, normoactive bowel sounds, no rebound/guarding; No hepatosplenomegaly  MUSCULOSKELETAL:  Normal gait; no clubbing or cyanosis of digits; no joint swelling or tenderness to palpation  PSYCH: A+O to person, place, and time; affect appropriate  NEUROLOGY: CN 2-12 are intact and symmetric; no gross sensory deficits   SKIN: No rashes; no palpable lesions    LABS:                        13.6   5.99  )-----------( 166      ( 07 Aug 2022 04:25 )             40.2     08-07    135  |  101  |  12  ----------------------------<  139<H>  4.1   |  23  |  0.88    Ca    9.4      07 Aug 2022 04:25  Phos  3.3     08-07  Mg     1.80     08-07      PTT - ( 07 Aug 2022 04:25 )  PTT:27.9 sec            RADIOLOGY & ADDITIONAL TESTS:  Results Reviewed:   Imaging Personally Reviewed:  Electrocardiogram Personally Reviewed:    COORDINATION OF CARE:  Care Discussed with Consultants/Other Providers [Y/N]:  Prior or Outpatient Records Reviewed [Y/N]:

## 2022-08-07 NOTE — PROGRESS NOTE ADULT - PROBLEM SELECTOR PLAN 5
- Well controlled with A1C of 6.8  - Diabetic diet, monitor FS's  - Will increase insulin dose today given elevated FS  - Adjust lantus as needed

## 2022-08-07 NOTE — PROGRESS NOTE ADULT - PROBLEM SELECTOR PLAN 6
Heparin SQ started after C      D/w ACP  Patient can't be discharged over weekend as there is no LPN available where he resides per SW notes ([facility] [place] [address])  Anticipate dc home on Monday (needs ambulette)

## 2022-08-08 VITALS
SYSTOLIC BLOOD PRESSURE: 128 MMHG | TEMPERATURE: 99 F | DIASTOLIC BLOOD PRESSURE: 78 MMHG | OXYGEN SATURATION: 100 % | RESPIRATION RATE: 18 BRPM | HEART RATE: 82 BPM

## 2022-08-08 LAB
ANION GAP SERPL CALC-SCNC: 15 MMOL/L — HIGH (ref 7–14)
APTT BLD: 27.7 SEC — SIGNIFICANT CHANGE UP (ref 27–36.3)
BUN SERPL-MCNC: 17 MG/DL — SIGNIFICANT CHANGE UP (ref 7–23)
CALCIUM SERPL-MCNC: 9.8 MG/DL — SIGNIFICANT CHANGE UP (ref 8.4–10.5)
CHLORIDE SERPL-SCNC: 98 MMOL/L — SIGNIFICANT CHANGE UP (ref 98–107)
CO2 SERPL-SCNC: 20 MMOL/L — LOW (ref 22–31)
CREAT SERPL-MCNC: 0.97 MG/DL — SIGNIFICANT CHANGE UP (ref 0.5–1.3)
EGFR: 92 ML/MIN/1.73M2 — SIGNIFICANT CHANGE UP
GLUCOSE BLDC GLUCOMTR-MCNC: 150 MG/DL — HIGH (ref 70–99)
GLUCOSE BLDC GLUCOMTR-MCNC: 168 MG/DL — HIGH (ref 70–99)
GLUCOSE SERPL-MCNC: 137 MG/DL — HIGH (ref 70–99)
HCT VFR BLD CALC: 43.5 % — SIGNIFICANT CHANGE UP (ref 39–50)
HGB BLD-MCNC: 14.1 G/DL — SIGNIFICANT CHANGE UP (ref 13–17)
MAGNESIUM SERPL-MCNC: 1.8 MG/DL — SIGNIFICANT CHANGE UP (ref 1.6–2.6)
MCHC RBC-ENTMCNC: 31.3 PG — SIGNIFICANT CHANGE UP (ref 27–34)
MCHC RBC-ENTMCNC: 32.4 GM/DL — SIGNIFICANT CHANGE UP (ref 32–36)
MCV RBC AUTO: 96.7 FL — SIGNIFICANT CHANGE UP (ref 80–100)
NRBC # BLD: 0 /100 WBCS — SIGNIFICANT CHANGE UP
NRBC # FLD: 0 K/UL — SIGNIFICANT CHANGE UP
PHOSPHATE SERPL-MCNC: 3.8 MG/DL — SIGNIFICANT CHANGE UP (ref 2.5–4.5)
PLATELET # BLD AUTO: 153 K/UL — SIGNIFICANT CHANGE UP (ref 150–400)
POTASSIUM SERPL-MCNC: 4.4 MMOL/L — SIGNIFICANT CHANGE UP (ref 3.5–5.3)
POTASSIUM SERPL-SCNC: 4.4 MMOL/L — SIGNIFICANT CHANGE UP (ref 3.5–5.3)
RBC # BLD: 4.5 M/UL — SIGNIFICANT CHANGE UP (ref 4.2–5.8)
RBC # FLD: 12.4 % — SIGNIFICANT CHANGE UP (ref 10.3–14.5)
SODIUM SERPL-SCNC: 133 MMOL/L — LOW (ref 135–145)
WBC # BLD: 5.12 K/UL — SIGNIFICANT CHANGE UP (ref 3.8–10.5)
WBC # FLD AUTO: 5.12 K/UL — SIGNIFICANT CHANGE UP (ref 3.8–10.5)

## 2022-08-08 RX ORDER — CLOPIDOGREL BISULFATE 75 MG/1
1 TABLET, FILM COATED ORAL
Qty: 30 | Refills: 3
Start: 2022-08-08 | End: 2022-12-05

## 2022-08-08 RX ORDER — PROPRANOLOL HCL 160 MG
1 CAPSULE, EXTENDED RELEASE 24HR ORAL
Qty: 0 | Refills: 0 | DISCHARGE

## 2022-08-08 RX ORDER — ACETAMINOPHEN 500 MG
2 TABLET ORAL
Qty: 0 | Refills: 0 | DISCHARGE
Start: 2022-08-08

## 2022-08-08 RX ORDER — ASPIRIN/CALCIUM CARB/MAGNESIUM 324 MG
1 TABLET ORAL
Qty: 30 | Refills: 3
Start: 2022-08-08 | End: 2022-12-05

## 2022-08-08 RX ORDER — LISINOPRIL 2.5 MG/1
5 TABLET ORAL DAILY
Refills: 0 | Status: DISCONTINUED | OUTPATIENT
Start: 2022-08-08 | End: 2022-08-08

## 2022-08-08 RX ORDER — LISINOPRIL 2.5 MG/1
1 TABLET ORAL
Qty: 30 | Refills: 3
Start: 2022-08-08 | End: 2022-12-05

## 2022-08-08 RX ADMIN — HEPARIN SODIUM 5000 UNIT(S): 5000 INJECTION INTRAVENOUS; SUBCUTANEOUS at 05:13

## 2022-08-08 RX ADMIN — Medication 81 MILLIGRAM(S): at 12:31

## 2022-08-08 RX ADMIN — Medication 1: at 12:34

## 2022-08-08 RX ADMIN — CLOPIDOGREL BISULFATE 75 MILLIGRAM(S): 75 TABLET, FILM COATED ORAL at 12:31

## 2022-08-08 RX ADMIN — Medication 7.5 MILLIGRAM(S): at 12:30

## 2022-08-08 RX ADMIN — OLANZAPINE 20 MILLIGRAM(S): 15 TABLET, FILM COATED ORAL at 12:30

## 2022-08-08 NOTE — SBIRT NOTE ADULT - NSSBIRTUNABLESCR_GEN_A_CORE
Pt declined to complete SBIRT screen. He also refused outpt substance abuse resources./Patient refused

## 2022-08-08 NOTE — PROGRESS NOTE ADULT - PROBLEM SELECTOR PLAN 4
- continue statin  - low fat diet

## 2022-08-08 NOTE — PROGRESS NOTE ADULT - PROBLEM SELECTOR PLAN 3
- Continue home medications  - dash diet

## 2022-08-08 NOTE — PROGRESS NOTE ADULT - PROBLEM SELECTOR PLAN 6
Heparin SQ started after [facility]      D/w ACP  Anticipate dc home today to [facility]  discharge planning and coordination ~ 45mins.

## 2022-08-08 NOTE — PROGRESS NOTE ADULT - SUBJECTIVE AND OBJECTIVE BOX
[facility] Division of Hospital Medicine  Deepti Ta MD  Pager (M-F, 8A-5P): [phone]  Other Times:  [id number]    Patient is a 57y old  Male who presents with a chief complaint of NSTEMI (07 Aug 2022 12:29)      SUBJECTIVE / OVERNIGHT EVENTS:      MEDICATIONS  (STANDING):  aspirin enteric coated 81 milliGRAM(s) Oral daily  atorvastatin 40 milliGRAM(s) Oral at bedtime  clopidogrel Tablet 75 milliGRAM(s) Oral daily  dextrose 5%. 1000 milliLiter(s) (50 mL/Hr) IV Continuous <Continuous>  dextrose 5%. 1000 milliLiter(s) (100 mL/Hr) IV Continuous <Continuous>  dextrose 50% Injectable 25 Gram(s) IV Push once  dextrose 50% Injectable 12.5 Gram(s) IV Push once  dextrose 50% Injectable 25 Gram(s) IV Push once  glucagon  Injectable 1 milliGRAM(s) IntraMuscular once  heparin   Injectable 5000 Unit(s) SubCutaneous every 8 hours  insulin glargine Injectable (LANTUS) 5 Unit(s) SubCutaneous at bedtime  insulin lispro (ADMELOG) corrective regimen sliding scale   SubCutaneous at bedtime  insulin lispro (ADMELOG) corrective regimen sliding scale   SubCutaneous three times a day before meals  OLANZapine 20 milliGRAM(s) Oral daily  trihexyphenidyl 7.5 milliGRAM(s) Oral two times a day    MEDICATIONS  (PRN):  acetaminophen     Tablet .. 650 milliGRAM(s) Oral every 6 hours PRN Temp greater or equal to 38C (100.4F), Mild Pain (1 - 3)  bisacodyl 10 milliGRAM(s) Oral at bedtime PRN Constipation  dextrose Oral Gel 15 Gram(s) Oral once PRN Blood Glucose LESS THAN 70 milliGRAM(s)/deciliter  LORazepam   Injectable 1 milliGRAM(s) IV Push every 10 minutes PRN Anxiety, chest pain  melatonin 3 milliGRAM(s) Oral at bedtime PRN Insomnia      CAPILLARY BLOOD GLUCOSE      POCT Blood Glucose.: 150 mg/dL (08 Aug 2022 08:22)  POCT Blood Glucose.: 181 mg/dL (07 Aug 2022 22:54)  POCT Blood Glucose.: 171 mg/dL (07 Aug 2022 17:52)  POCT Blood Glucose.: 165 mg/dL (07 Aug 2022 12:24)    I&O's Summary      PHYSICAL EXAM:  Vital Signs Last 24 Hrs  T(C): 36.7 (08 Aug 2022 05:10), Max: 37.1 (07 Aug 2022 22:40)  T(F): 98.1 (08 Aug 2022 05:10), Max: 98.8 (07 Aug 2022 22:40)  HR: 80 (08 Aug 2022 05:10) (78 - 92)  BP: 145/80 (08 Aug 2022 05:10) (111/75 - 145/80)  BP(mean): --  RR: 17 (08 Aug 2022 05:10) (17 - 18)  SpO2: 98% (08 Aug 2022 05:10) (96% - 98%)    Parameters below as of 08 Aug 2022 05:10  Patient On (Oxygen Delivery Method): room air        CONSTITUTIONAL: NAD, well-developed, well-groomed  EYES: EOMI; conjunctiva and sclera clear  ENMT: Moist oral mucosa  RESPIRATORY: Normal respiratory effort; lungs are clear to auscultation bilaterally  CARDIOVASCULAR: Regular rate and rhythm, normal S1 and S2, no murmur; No lower extremity edema  ABDOMEN: Nontender to palpation, normoactive bowel sounds, no rebound/guarding  MUSCULOSKELETAL:   no clubbing or cyanosis of digits; no joint swelling or tenderness to palpation  PSYCH: A+O to person, place, and time; affect appropriate  NEUROLOGY: CN 2-12 are intact and symmetric; no gross sensory deficits   SKIN: No rashes; no palpable lesions    LABS:                        14.1   5.12  )-----------( 153      ( 08 Aug 2022 05:18 )             43.5     08-08    133<L>  |  98  |  17  ----------------------------<  137<H>  4.4   |  20<L>  |  0.97    Ca    9.8      08 Aug 2022 05:18  Phos  3.8     08-08  Mg     1.80     08-08      PTT - ( 08 Aug 2022 05:18 )  PTT:27.7 sec          RADIOLOGY & ADDITIONAL TESTS:  Results Reviewed:   Imaging Personally Reviewed:  Electrocardiogram Personally Reviewed:    COORDINATION OF CARE:  Care Discussed with Consultants/Other Providers [Y/N]: Y  Prior or Outpatient Records Reviewed [Y/N]: Y   [facility] of Hospital Medicine  Deepti Ta MD  Pager (M-F, 8A-5P): [phone]  Other Times:  [id number]    Patient is a 57y old  Male who presents with a chief complaint of NSTEMI (07 Aug 2022 12:29)      SUBJECTIVE / OVERNIGHT EVENTS:  Pt denies chest pain or sob.  Feels comfortable. Tolerating po.  Tele: sinus     MEDICATIONS  (STANDING):  aspirin enteric coated 81 milliGRAM(s) Oral daily  atorvastatin 40 milliGRAM(s) Oral at bedtime  clopidogrel Tablet 75 milliGRAM(s) Oral daily  dextrose 5%. 1000 milliLiter(s) (50 mL/Hr) IV Continuous <Continuous>  dextrose 5%. 1000 milliLiter(s) (100 mL/Hr) IV Continuous <Continuous>  dextrose 50% Injectable 25 Gram(s) IV Push once  dextrose 50% Injectable 12.5 Gram(s) IV Push once  dextrose 50% Injectable 25 Gram(s) IV Push once  glucagon  Injectable 1 milliGRAM(s) IntraMuscular once  heparin   Injectable 5000 Unit(s) SubCutaneous every 8 hours  insulin glargine Injectable (LANTUS) 5 Unit(s) SubCutaneous at bedtime  insulin lispro (ADMELOG) corrective regimen sliding scale   SubCutaneous at bedtime  insulin lispro (ADMELOG) corrective regimen sliding scale   SubCutaneous three times a day before meals  OLANZapine 20 milliGRAM(s) Oral daily  trihexyphenidyl 7.5 milliGRAM(s) Oral two times a day    MEDICATIONS  (PRN):  acetaminophen     Tablet .. 650 milliGRAM(s) Oral every 6 hours PRN Temp greater or equal to 38C (100.4F), Mild Pain (1 - 3)  bisacodyl 10 milliGRAM(s) Oral at bedtime PRN Constipation  dextrose Oral Gel 15 Gram(s) Oral once PRN Blood Glucose LESS THAN 70 milliGRAM(s)/deciliter  LORazepam   Injectable 1 milliGRAM(s) IV Push every 10 minutes PRN Anxiety, chest pain  melatonin 3 milliGRAM(s) Oral at bedtime PRN Insomnia      CAPILLARY BLOOD GLUCOSE      POCT Blood Glucose.: 150 mg/dL (08 Aug 2022 08:22)  POCT Blood Glucose.: 181 mg/dL (07 Aug 2022 22:54)  POCT Blood Glucose.: 171 mg/dL (07 Aug 2022 17:52)  POCT Blood Glucose.: 165 mg/dL (07 Aug 2022 12:24)    I&O's Summary      PHYSICAL EXAM:  Vital Signs Last 24 Hrs  T(C): 36.7 (08 Aug 2022 05:10), Max: 37.1 (07 Aug 2022 22:40)  T(F): 98.1 (08 Aug 2022 05:10), Max: 98.8 (07 Aug 2022 22:40)  HR: 80 (08 Aug 2022 05:10) (78 - 92)  BP: 145/80 (08 Aug 2022 05:10) (111/75 - 145/80)  BP(mean): --  RR: 17 (08 Aug 2022 05:10) (17 - 18)  SpO2: 98% (08 Aug 2022 05:10) (96% - 98%)    Parameters below as of 08 Aug 2022 05:10  Patient On (Oxygen Delivery Method): room air        CONSTITUTIONAL: NAD, well-developed, well-groomed  EYES: EOMI; conjunctiva and sclera clear  ENMT: Moist oral mucosa  RESPIRATORY: Normal respiratory effort; lungs are clear to auscultation bilaterally  CARDIOVASCULAR: Regular rate and rhythm, normal S1 and S2, no murmur; No lower extremity edema  ABDOMEN: Nontender to palpation, normoactive bowel sounds, no rebound/guarding  MUSCULOSKELETAL:   no clubbing or cyanosis of digits; no joint swelling or tenderness to palpation  PSYCH: A+O to person, place, and time; affect appropriate  NEUROLOGY: CN 2-12 are intact and symmetric; no gross sensory deficits   SKIN: No rashes; no palpable lesions    LABS:                        14.1   5.12  )-----------( 153      ( 08 Aug 2022 05:18 )             43.5     08-08    133<L>  |  98  |  17  ----------------------------<  137<H>  4.4   |  20<L>  |  0.97    Ca    9.8      08 Aug 2022 05:18  Phos  3.8     08-08  Mg     1.80     08-08      PTT - ( 08 Aug 2022 05:18 )  PTT:27.7 sec          RADIOLOGY & ADDITIONAL TESTS:  Results Reviewed:   Imaging Personally Reviewed:  Electrocardiogram Personally Reviewed:    COORDINATION OF CARE:  Care Discussed with Consultants/Other Providers [Y/N]: Y  Prior or Outpatient Records Reviewed [Y/N]: Y

## 2022-08-08 NOTE — DISCHARGE NOTE NURSING/CASE MANAGEMENT/SOCIAL WORK - NSDCPEFALRISK_GEN_ALL_CORE
For information on Fall & Injury Prevention, visit: [email or web address] OR  [email or web address] OR  https://www.cdc.gov/steadi/patient.html

## 2022-08-08 NOTE — DISCHARGE NOTE NURSING/CASE MANAGEMENT/SOCIAL WORK - NSDCCRNAME_GEN_ALL_CORE_FT
1 - [facility], [address] [facility] [address], [address], [place]  2 - ambulette arranged by [facility]

## 2022-08-08 NOTE — PROGRESS NOTE ADULT - PROBLEM SELECTOR PLAN 5
Hospitalist Daily Progress Note  Name: Jarett Acevedo  Age: 69 y.o. Gender: female  CodeStatus: Full Code  Allergies: Ace Inhibitors  Benazepril  Ciprofloxacin  Shellfish Allergy  Sulfa Antibiotics    Chief Complaint:No chief complaint on file. Primary Care Provider: Linsey Montoya MD  InpatientTreatment Team: Treatment Team: Attending Provider: Ezequiel Gamboa DO; Consulting Physician: Seamus Serrano MD; Consulting Physician: Everett Knowles MD; : Hosea Escoto, CRUZ; Registered Nurse: Hilda Watson, CRUZ; Registered Nurse: Kemal Wolff, RN; Utilization Reviewer: Stephie Toeldo RN; Patient Care Tech: Marcie Niño  Admission Date: 2/2/2021      Subjective: Patient is seen and evaluated at bedside. Hip and shoulder imaging negative. [facility] from [facility] still pending. Still awaiting oncology recommendations. No new acute concerns. Pt remains adamant not to treat any cancer with chemo or radiation. Physical Exam  Constitutional:       Appearance: Normal appearance. She is obese. She is not ill-appearing or diaphoretic. HENT:      Head: Normocephalic and atraumatic. Nose: Nose normal.      Mouth/Throat:      Mouth: Mucous membranes are moist.      Pharynx: Oropharynx is clear. Eyes:      Conjunctiva/sclera: Conjunctivae normal.      Pupils: Pupils are equal, round, and reactive to light. Cardiovascular:      Rate and Rhythm: Normal rate. Heart sounds: Murmur present. No friction rub. No gallop. Pulmonary:      Breath sounds: No wheezing, rhonchi or rales. Abdominal:      General: There is no distension. Tenderness: There is no abdominal tenderness. There is no guarding. Musculoskeletal: Normal range of motion. Right lower leg: No edema. Left lower leg: No edema. Neurological:      General: No focal deficit present. Mental Status: She is alert and oriented to person, place, and time.    Psychiatric:         Mood and Affect: Mood normal. Thought Content: Thought content normal.         Judgment: Judgment normal.         Review of Systems  14 point ROS is reviewed negative except for as above  Medications:  Reviewed    Infusion Medications:     Scheduled Medications:    sodium chloride flush  10 mL Intravenous 2 times per day    heparin (porcine)  5,000 Units Subcutaneous 3 times per day    heparin (porcine)  2,000 Units Intravenous Once    heparin (porcine)  4,000 Units Intercatheter Once    lidocaine  1 patch Transdermal Daily    pantoprazole  40 mg Oral QAM AC     PRN Meds: sodium chloride flush, promethazine **OR** ondansetron, polyethylene glycol, acetaminophen **OR** acetaminophen, traMADol    Labs:   Recent Labs     02/02/21  1442 02/03/21  0515 02/04/21  0521   WBC 14.4* 12.6* 12.6*   HGB 11.0* 8.7* 9.2*   HCT 33.8* 26.7* 29.5*    289 307     Recent Labs     02/02/21  1442 02/03/21  0515 02/04/21  0528   * 135 136   K 3.5 3.4 4.0   CL 97 101 102   CO2 24 26 28   BUN 11 12 5*   CREATININE 3.76* 4.06* 1.97*   CALCIUM 8.5 7.5* 7.3*     Recent Labs     02/02/21  1442 02/03/21  0515 02/04/21  0528   AST 27 67* 155*   ALT 6 22 45*   BILITOT 0.6 0.5 0.5   ALKPHOS 189* 164* 186*     No results for input(s): INR in the last 72 hours. Recent Labs     02/02/21  1442 02/03/21  0049 02/03/21  0515   TROPONINI 0.019* 0.021* 0.019*       Urinalysis:   Lab Results   Component Value Date    NITRU Negative 02/02/2021    WBCUA 0-2 02/02/2021    WBCUA 1 09/10/2020    BACTERIA RARE 02/02/2021    RBCUA 0-2 02/02/2021    RBCUA 1 09/10/2020    BLOODU Negative 02/02/2021    SPECGRAV 1.015 02/02/2021    GLUCOSEU Negative 02/02/2021       Radiology:   Most recent    Chest CT      WITH CONTRAST:No results found for this or any previous visit. WITHOUT CONTRAST: No results found for this or any previous visit. CXR      2-view: No results found for this or any previous visit.      Portable: Results for orders placed during the hospital encounter of 02/02/21   XR CHEST PORTABLE    Narrative Exam: XR CHEST PORTABLE    History:  shoulder pain weakness     Technique: AP portable view of the chest obtained. Comparison: None    Chest x-ray portable   Findings: There is a right internal jugular dialysis catheter in place . The cardiomediastinal silhouette is within normal limits. There are no infiltrates, consolidations or effusions. There is mild blunting of the left costophrenic recesses which may represent a small pleural effusion. Bones of the thorax appear intact. Impression No acute cardiopulmonary changes. Mild blunting of the costophrenic recess on the left may be due to a small effusion versus prior scarring. Echo No results found for this or any previous visit. Assessment/Plan:    Active Hospital Problems    Diagnosis Date Noted    General weakness [R53.1] 02/03/2021     Generalized weakness with cranial lytic lesions: Records requested from [facility]. SPEP UPEP pending. PT and OT evaluation. Left shoulder imaging negative. . Added ultram for pain and a lidocaine patch for pain. Oncology consulted for imaging recommendations. ESRD on hemodialysis: Nephrology following. Continue HD as scheduled. GERD: Protonix  DVT prophylaxis Heparin    Additional work up or/and treatment plan may be added today or then after based on clinical progression. I am managing a portion of pt care. Some medical issues are handled byother specialists. Additional work up and treatment should be done in out pt setting by pt PCP and other out pt providers. In addition to examining and evaluating pt, I spent additional time explaining care, normaland abnormal findings, and treatment plan. All of pt questions were answered. Counseling, diet and education were provided. Case will be discussed with nursing staff when appropriate. Family will be updated if and whenappropriate.       Electronically signed by Faustina Conteh DO on 2/4/2021 at 9:54 AM - Well controlled with A1C of 6.8  - Diabetic diet, monitor FS's  - continue with current regimen

## 2022-08-08 NOTE — PROGRESS NOTE ADULT - PROBLEM SELECTOR PLAN 1
- Atypical chest pain in the setting of cocaine use for one day  - Utox + for cocaine  - Differential includes ACS vs vasospastic angina  - S/P brilinta load, ASA load, and on heparin gtt, s/p LHC done today that showed-  Small caliber distal LAD with diffuse disease , no change with NTG. This could indicate SCAD or diffuse atherosclerotic coronary artery disease.   - Apprec Cards recs, c/w medical therapy (ASA/plavix)  - Avoid beta-blocker given cocaine abuse; patient counseled on abstaining from cocaine use  - C/w lipitor; start lisinopril   - TTE shows EF 64%, LVH, o/w non-revealing, no WMA

## 2022-08-08 NOTE — DISCHARGE NOTE NURSING/CASE MANAGEMENT/SOCIAL WORK - PATIENT PORTAL LINK FT
You can access the FollowMyHealth Patient Portal offered by [facility] by registering at the following website: [email or web address]. By joining [facility]’s FollowMyHealth portal, you will also be able to view your health information using other applications (apps) compatible with our system.

## 2022-08-08 NOTE — PROGRESS NOTE ADULT - PROBLEM SELECTOR PROBLEM 1
NSTEMI (non-ST elevation myocardial infarction)

## 2022-12-06 NOTE — PROGRESS NOTE ADULT - PROBLEM SELECTOR PLAN 1
Problem: Prexisting or High Potential for Compromised Skin Integrity  Goal: Skin integrity is maintained or improved  Description: INTERVENTIONS:  - Identify patients at risk for skin breakdown  - Assess and monitor skin integrity  - Assess and monitor nutrition and hydration status  - Monitor labs   - Assess for incontinence   - Turn and reposition patient  - Assist with mobility/ambulation  - Relieve pressure over bony prominences  - Avoid friction and shearing  - Provide appropriate hygiene as needed including keeping skin clean and dry  - Evaluate need for skin moisturizer/barrier cream  - Collaborate with interdisciplinary team   - Patient/family teaching  - Consider wound care consult   Outcome: Progressing     Problem: Potential for Falls  Goal: Patient will remain free of falls  Description: INTERVENTIONS:  - Educate patient/family on patient safety including physical limitations  - Instruct patient to call for assistance with activity   - Consult OT/PT to assist with strengthening/mobility   - Keep Call bell within reach  - Keep bed low and locked with side rails adjusted as appropriate  - Keep care items and personal belongings within reach  - Initiate and maintain comfort rounds  - Make Fall Risk Sign visible to staff  - Offer Toileting every  Hours, in advance of need  - Initiate/Maintain alarm  - Obtain necessary fall risk management equipment:   - Apply yellow socks and bracelet for high fall risk patients  - Consider moving patient to room near nurses station  Outcome: Progressing     Problem: MOBILITY - ADULT  Goal: Maintain or return to baseline ADL function  Description: INTERVENTIONS:  -  Assess patient's ability to carry out ADLs; assess patient's baseline for ADL function and identify physical deficits which impact ability to perform ADLs (bathing, care of mouth/teeth, toileting, grooming, dressing, etc )  - Assess/evaluate cause of self-care deficits   - Assess range of motion  - Assess patient's mobility; develop plan if impaired  - Assess patient's need for assistive devices and provide as appropriate  - Encourage maximum independence but intervene and supervise when necessary  - Involve family in performance of ADLs  - Assess for home care needs following discharge   - Consider OT consult to assist with ADL evaluation and planning for discharge  - Provide patient education as appropriate  Outcome: Progressing  Goal: Maintains/Returns to pre admission functional level  Description: INTERVENTIONS:  - Perform BMAT or MOVE assessment daily    - Set and communicate daily mobility goal to care team and patient/family/caregiver  - Collaborate with rehabilitation services on mobility goals if consulted  - Perform Range of Motion  times a day  - Reposition patient every  hours    - Dangle patient  times a day  - Stand patient  times a day  - Ambulate patient  times a day  - Out of bed to chair  times a day   - Out of bed for meals  times a day  - Out of bed for toileting  - Record patient progress and toleration of activity level   Outcome: Progressing     Problem: PAIN - ADULT  Goal: Verbalizes/displays adequate comfort level or baseline comfort level  Description: Interventions:  - Encourage patient to monitor pain and request assistance  - Assess pain using appropriate pain scale  - Administer analgesics based on type and severity of pain and evaluate response  - Implement non-pharmacological measures as appropriate and evaluate response  - Consider cultural and social influences on pain and pain management  - Notify physician/advanced practitioner if interventions unsuccessful or patient reports new pain  Outcome: Progressing     Problem: INFECTION - ADULT  Goal: Absence or prevention of progression during hospitalization  Description: INTERVENTIONS:  - Assess and monitor for signs and symptoms of infection  - Monitor lab/diagnostic results  - Monitor all insertion sites, i e  indwelling lines, tubes, and drains  - Monitor endotracheal if appropriate and nasal secretions for changes in amount and color  - [place] appropriate cooling/warming therapies per order  - Administer medications as ordered  - Instruct and encourage patient and family to use good hand hygiene technique  - Identify and instruct in appropriate isolation precautions for identified infection/condition  Outcome: Progressing     Problem: SAFETY ADULT  Goal: Maintains/Returns to pre admission functional level  Description: INTERVENTIONS:  - Perform BMAT or MOVE assessment daily    - Set and communicate daily mobility goal to care team and patient/family/caregiver  - Collaborate with rehabilitation services on mobility goals if consulted  - Perform Range of Motion  times a day  - Reposition patient every  hours    - Dangle patient  times a day  - Stand patient  times a day  - Ambulate patient  times a day  - Out of bed to chair  times a day   - Out of bed for meals times a day  - Out of bed for toileting  - Record patient progress and toleration of activity level   Outcome: Progressing     Problem: DISCHARGE PLANNING  Goal: Discharge to home or other facility with appropriate resources  Description: INTERVENTIONS:  - Identify barriers to discharge w/patient and caregiver  - Arrange for needed discharge resources and transportation as appropriate  - Identify discharge learning needs (meds, wound care, etc )  - Arrange for interpretive services to assist at discharge as needed  - Refer to Case Management Department for coordinating discharge planning if the patient needs post-hospital services based on physician/advanced practitioner order or complex needs related to functional status, cognitive ability, or social support system  Outcome: Progressing     Problem: Knowledge Deficit  Goal: Patient/family/caregiver demonstrates understanding of disease process, treatment plan, medications, and discharge instructions  Description: Complete learning assessment and assess knowledge base  Interventions:  - Provide teaching at level of understanding  - Provide teaching via preferred learning methods  Outcome: Progressing     Problem: Nutrition/Hydration-ADULT  Goal: Nutrient/Hydration intake appropriate for improving, restoring or maintaining nutritional needs  Description: Monitor and assess patient's nutrition/hydration status for malnutrition  Collaborate with interdisciplinary team and initiate plan and interventions as ordered  Monitor patient's weight and dietary intake as ordered or per policy  Utilize nutrition screening tool and intervene as necessary  Determine patient's food preferences and provide high-protein, high-caloric foods as appropriate       INTERVENTIONS:  - Monitor oral intake, urinary output, labs, and treatment plans  - Assess nutrition and hydration status and recommend course of action  - Evaluate amount of meals eaten  - Assist patient with eating if necessary   - Allow adequate time for meals  - Recommend/ encourage appropriate diets, oral nutritional supplements, and vitamin/mineral supplements  - Order, calculate, and assess calorie counts as needed  - Recommend, monitor, and adjust tube feedings and TPN/PPN based on assessed needs  - Assess need for intravenous fluids  - Provide specific nutrition/hydration education as appropriate  - Include patient/family/caregiver in decisions related to nutrition  Outcome: Progressing     Problem: SAFETY,RESTRAINT: NV/NON-SELF DESTRUCTIVE BEHAVIOR  Goal: Remains free of harm/injury (restraint for non violent/non self-detsructive behavior)  Description: INTERVENTIONS:  - Instruct patient/family regarding restraint use   - Assess and monitor physiologic and psychological status   - Provide interventions and comfort measures to meet assessed patient needs   - Identify and implement measures to help patient regain control  - Assess readiness for release of restraint   Outcome: - Admit to medicine on telemetry  - Atypical chest pain in the setting of cocaine use for one day  - Initial troponin 154 and EKG grossly unchanged from baseline  - Utox + for cocaine  - Differential includes ACS vs vasospastic angina  - S/P brilinta load, ASA load, and on heparin gtt - Would use plavix in place of Brilinta given cost and daily dosing to improve compliance; continue heparin gtt  - Possibility this could be a prolonged vasospastic event from cocaine use; however less likely as Troponin normally not elevated in vasospasm.  - For now will avoid beta-blocker given cocaine abuse; patient counseled on abstaining from cocaine use.  - Check A1C and lipids - aggressive statin use  - TTE ordered  - D/W Dr. Madden and will Plan for cardiac Cath with Dr. Jacki morse today. Progressing  Goal: Returns to optimal restraint-free functioning  Description: INTERVENTIONS:  - Assess the patient's behavior and symptoms that indicate continued need for restraint  - Identify and implement measures to help patient regain control  - Assess readiness for release of restraint   Outcome: Progressing     Problem: COPING  Goal: Pt/Family able to verbalize concerns and demonstrate effective coping strategies  Description: INTERVENTIONS:  - Assist patient/family to identify coping skills, available support systems and cultural and spiritual values  - Provide emotional support, including active listening and acknowledgement of concerns of patient and caregivers  - Reduce environmental stimuli, as able  - Provide patient education  - Assess for spiritual pain/suffering and initiate spiritual care, including notification of Pastoral Care or bib based community as needed  - Assess effectiveness of coping strategies  Outcome: Progressing  Goal: Will report anxiety at manageable levels  Description: INTERVENTIONS:  - Administer medication as ordered  - Teach and encourage coping skills  - Provide emotional support  - Assess patient/family for anxiety and ability to cope  Outcome: Progressing     Problem: DEATH & DYING  Goal: Pt/Family communicate acceptance of impending death and expresses psychological comfort and peace  Description: INTERVENTIONS:  - Assess patient/family anxiety and grief process related to end of life issues  - Provide emotional, spiritual and psychosocial support  - Provide information about the patient’s health status with consideration of family and cultural values  - Communicate willingness to discuss death and facilitate grief process  with patient/family as appropriate  - Emphasize sustaining relationships within family system and community, or bib/spiritual traditions  - Initiate Spiritual Care, Pastoral care or other ancillary consults as needed  - Refer to community support groups as appropriate  Outcome: Progressing     Problem: DECISION MAKING  Goal: Pt/Family able to effectively weigh alternatives and participate in decision making related to treatment and care  Description: INTERVENTIONS:  - Identify decision maker  - Determine when there are differences among patient's view, family's view, and healthcare provider's view of patient condition and care goals  - Facilitate patient/family articulation of goals for care  - Help patient/family identify pros/cons of alternative solutions  - Provide information as requested by patient/family  - Respect patient/family rights related to privacy and sharing information   - Serve as a liaison between patient, family and health care team  - Initiate consults as appropriate (Ethics Team, Palliative Care, Family Care Conference, etc )  Outcome: Progressing     Problem: CONFUSION/THOUGHT DISTURBANCE  Goal: Thought disturbances (confusion, delirium, depression, dementia or psychosis) are managed to maintain or return to baseline mental status and functional level  Description: INTERVENTIONS:  - Assess for possible contributors to  thought disturbance, including but not limited to medications, infection, impaired vision or hearing, underlying metabolic abnormalities, dehydration, respiratory compromise,  psychiatric diagnoses and notify attending PHYSICAN/AP  - Monitor and intervene to maintain adequate nutrition, hydration, elimination, sleep and activity  - Decrease environmental stimuli, including noise as appropriate  - Provide frequent contacts to provide refocusing, direction and reassurance as needed  Approach patient calmly with eye contact and at their level    - [place] high risk fall precautions, aspiration precautions and other safety measures, as indicated  - If delirium suspected, notify physician/AP of change in condition and request immediate in-person evaluation  - Pursue consults as appropriate including Geriatric (campus dependent), OT for cognitive evaluation/activity planning, psychiatric, pastoral care, etc   Outcome: Progressing     Problem: BEHAVIOR  Goal: Pt/Family maintain appropriate behavior and adhere to behavioral management agreement, if implemented  Description: INTERVENTIONS:  - Assess the family dynamic   - Encourage verbalization of thoughts and concerns in a socially appropriate manner  - Assess patient/family's coping skills and non-compliant behavior (including use of illegal substances)  - Utilize positive, consistent limit setting strategies supporting safety of patient, staff and others  - Initiate consult with Case Management, Spiritual Care or other ancillary services as appropriate  - If a patient's/visitor's behavior jeopardizes the safety of the patient, staff, or others, refer to organization procedure     - Notify Security of behavior or suspected illegal substances which indicate the need for search of the patient and/or belongings  - Encourage participation in the decision making process about a behavioral management agreement; implement if patient meets criteria  Outcome: Progressing     Problem: SPIRITUAL CARE  Goal: Pt/Family able to move forward in process of forgiving self, others and/or higher power  Description: INTERVENTIONS:  - Assist patient with any spiritual needs/requests such as communion, confession, anointing, etc  - Explore guilt and help patient/family identify possible spiritual/cultural beliefs and values  - Explore possibilities of making amends & reconciliation with self, others, and/or a greater power  - Guide patient/family in identifying painful feelings  - Help patient explore and identify spiritual beliefs, cultural understandings or values that may help or hinder letting go of issue  - Help patient explore feelings of anger, bitterness, resentment, anxiety   Help patient/family identify and examine the situation in which these feelings are experienced  - Help patient/family identify destructive displacement of feelings onto other individuals  - Refer patient to formal counseling and/or to bib community for further support as needed or per request  Outcome: Progressing  Goal: Patient feels balance and connection with others and/or higher power that empowers the self during times of loss, guilt and fear  Description: INTERVENTIONS:  - Create safety for patient through empathic presence and non-judgmental listening  - Encourage patient to explore his/her values, beliefs and/or spiritual images and practices  - Encourage use of breath work, imagery, meditation, relaxation, reiki to ease distress and provide healing  - Encourage use of cultural and spiritual celebrations and rituals  - Facilitate discussion that helps patient sort out spiritual concerns  - Help patient identify where meaning/hope/comfort & strength are in his/her life  - Refer patient to bib community for assistance, as appropriate  - Respond to patient/family need for prayer/ritual/sacrament/ceremony  Outcome: Progressing - Atypical chest pain in the setting of cocaine use for one day  - Initial troponin 154 and EKG grossly unchanged from baseline  - Utox + for cocaine  - Differential includes ACS vs vasospastic angina  - S/P brilinta load, ASA load, and on heparin gtt, s/p LHC done today that showed-  Small caliber distal LAD with diffuse disease , no change with NTG. This could indicate SCAD or diffuse atherosclerotic coronary artery disease.   - Apprec Cards recs, c/w medical therapy (ASA/plavix)  - Avoid beta-blocker given cocaine abuse; patient counseled on abstaining from cocaine use  - C/w lipitor  - TTE shows EF 64%, LVH, o/w non-revealing, no WMA

## 2024-08-27 PROBLEM — F31.9 BIPOLAR DISORDER, UNSPECIFIED: Chronic | Status: ACTIVE | Noted: 2022-08-04

## 2024-08-27 PROBLEM — E11.9 TYPE 2 DIABETES MELLITUS WITHOUT COMPLICATIONS: Chronic | Status: ACTIVE | Noted: 2020-06-22

## 2024-08-27 PROBLEM — I10 ESSENTIAL (PRIMARY) HYPERTENSION: Chronic | Status: ACTIVE | Noted: 2020-06-22

## 2024-08-27 PROBLEM — E78.5 HYPERLIPIDEMIA, UNSPECIFIED: Chronic | Status: ACTIVE | Noted: 2020-06-22

## 2024-08-27 PROBLEM — F25.9 SCHIZOAFFECTIVE DISORDER, UNSPECIFIED: Chronic | Status: ACTIVE | Noted: 2020-06-22

## 2024-10-10 ENCOUNTER — OUTPATIENT (OUTPATIENT)
Dept: OUTPATIENT SERVICES | Facility: HOSPITAL | Age: 59
LOS: 1 days | End: 2024-10-10
Payer: MEDICAID

## 2024-10-10 ENCOUNTER — APPOINTMENT (OUTPATIENT)
Dept: NEUROLOGY | Facility: HOSPITAL | Age: 59
End: 2024-10-10

## 2024-10-10 VITALS
DIASTOLIC BLOOD PRESSURE: 85 MMHG | SYSTOLIC BLOOD PRESSURE: 122 MMHG | HEIGHT: 72 IN | RESPIRATION RATE: 14 BRPM | HEART RATE: 86 BPM | BODY MASS INDEX: 23.43 KG/M2 | TEMPERATURE: 98 F | WEIGHT: 173 LBS | OXYGEN SATURATION: 97 %

## 2024-10-10 DIAGNOSIS — R25.1 TREMOR, UNSPECIFIED: ICD-10-CM

## 2024-10-10 DIAGNOSIS — R56.9 UNSPECIFIED CONVULSIONS: ICD-10-CM

## 2024-10-10 PROCEDURE — G0463: CPT

## 2025-01-30 ENCOUNTER — OUTPATIENT (OUTPATIENT)
Dept: OUTPATIENT SERVICES | Facility: HOSPITAL | Age: 60
LOS: 1 days | End: 2025-01-30
Payer: MEDICAID

## 2025-01-30 ENCOUNTER — APPOINTMENT (OUTPATIENT)
Dept: NEUROLOGY | Facility: HOSPITAL | Age: 60
End: 2025-01-30

## 2025-01-30 VITALS
OXYGEN SATURATION: 94 % | TEMPERATURE: 97.7 F | WEIGHT: 181 LBS | HEIGHT: 68 IN | DIASTOLIC BLOOD PRESSURE: 77 MMHG | RESPIRATION RATE: 14 BRPM | HEART RATE: 76 BPM | SYSTOLIC BLOOD PRESSURE: 114 MMHG | BODY MASS INDEX: 27.43 KG/M2

## 2025-01-30 DIAGNOSIS — R56.9 UNSPECIFIED CONVULSIONS: ICD-10-CM

## 2025-01-30 DIAGNOSIS — R25.1 TREMOR, UNSPECIFIED: ICD-10-CM

## 2025-01-30 PROCEDURE — G0463: CPT

## 2025-02-04 DIAGNOSIS — R25.1 TREMOR, UNSPECIFIED: ICD-10-CM

## 2025-05-29 ENCOUNTER — APPOINTMENT (OUTPATIENT)
Dept: NEUROLOGY | Facility: HOSPITAL | Age: 60
End: 2025-05-29